# Patient Record
Sex: FEMALE | Race: WHITE | NOT HISPANIC OR LATINO
[De-identification: names, ages, dates, MRNs, and addresses within clinical notes are randomized per-mention and may not be internally consistent; named-entity substitution may affect disease eponyms.]

---

## 2017-02-28 ENCOUNTER — APPOINTMENT (OUTPATIENT)
Dept: HEMATOLOGY ONCOLOGY | Facility: CLINIC | Age: 57
End: 2017-02-28

## 2017-02-28 VITALS
BODY MASS INDEX: 47.62 KG/M2 | OXYGEN SATURATION: 98 % | HEART RATE: 98 BPM | RESPIRATION RATE: 18 BRPM | TEMPERATURE: 98.1 F | WEIGHT: 252 LBS | SYSTOLIC BLOOD PRESSURE: 150 MMHG | DIASTOLIC BLOOD PRESSURE: 90 MMHG

## 2017-04-03 ENCOUNTER — APPOINTMENT (OUTPATIENT)
Dept: FAMILY MEDICINE | Facility: CLINIC | Age: 57
End: 2017-04-03

## 2017-04-03 VITALS
TEMPERATURE: 99 F | HEART RATE: 78 BPM | SYSTOLIC BLOOD PRESSURE: 118 MMHG | DIASTOLIC BLOOD PRESSURE: 78 MMHG | RESPIRATION RATE: 20 BRPM

## 2017-04-11 ENCOUNTER — RX RENEWAL (OUTPATIENT)
Age: 57
End: 2017-04-11

## 2017-04-17 ENCOUNTER — RX RENEWAL (OUTPATIENT)
Age: 57
End: 2017-04-17

## 2017-04-20 ENCOUNTER — APPOINTMENT (OUTPATIENT)
Dept: FAMILY MEDICINE | Facility: CLINIC | Age: 57
End: 2017-04-20

## 2017-04-20 VITALS
SYSTOLIC BLOOD PRESSURE: 112 MMHG | RESPIRATION RATE: 20 BRPM | HEART RATE: 68 BPM | DIASTOLIC BLOOD PRESSURE: 70 MMHG | HEIGHT: 61 IN | WEIGHT: 253 LBS | BODY MASS INDEX: 47.77 KG/M2

## 2017-04-21 ENCOUNTER — RESULT REVIEW (OUTPATIENT)
Age: 57
End: 2017-04-21

## 2017-04-21 LAB
T3FREE SERPL-MCNC: 2.88 PG/ML
T4 FREE SERPL-MCNC: 1 NG/DL
TSH SERPL-ACNC: 4.08 UIU/ML

## 2017-06-01 LAB
ALBUMIN SERPL ELPH-MCNC: 4 G/DL
ALP BLD-CCNC: 71 U/L
ALT SERPL-CCNC: 29 U/L
ANION GAP SERPL CALC-SCNC: 17 MMOL/L
AST SERPL-CCNC: 23 U/L
BASOPHILS # BLD AUTO: 0.01 K/UL
BASOPHILS NFR BLD AUTO: 0.1 %
BILIRUB SERPL-MCNC: 0.4 MG/DL
BUN SERPL-MCNC: 13 MG/DL
CALCIUM SERPL-MCNC: 10.1 MG/DL
CHLORIDE SERPL-SCNC: 104 MMOL/L
CO2 SERPL-SCNC: 17 MMOL/L
CREAT SERPL-MCNC: 0.71 MG/DL
EOSINOPHIL # BLD AUTO: 0.09 K/UL
EOSINOPHIL NFR BLD AUTO: 1.1 %
GLUCOSE SERPL-MCNC: 136 MG/DL
HCT VFR BLD CALC: 39.7 %
HGB BLD-MCNC: 12.8 G/DL
IMM GRANULOCYTES NFR BLD AUTO: 0.1 %
LYMPHOCYTES # BLD AUTO: 1.68 K/UL
LYMPHOCYTES NFR BLD AUTO: 20.2 %
MAN DIFF?: NORMAL
MCHC RBC-ENTMCNC: 27.9 PG
MCHC RBC-ENTMCNC: 32.2 GM/DL
MCV RBC AUTO: 86.5 FL
MONOCYTES # BLD AUTO: 0.41 K/UL
MONOCYTES NFR BLD AUTO: 4.9 %
NEUTROPHILS # BLD AUTO: 6.1 K/UL
NEUTROPHILS NFR BLD AUTO: 73.6 %
PLATELET # BLD AUTO: 337 K/UL
POTASSIUM SERPL-SCNC: 4.2 MMOL/L
PROT SERPL-MCNC: 7.2 G/DL
RBC # BLD: 4.59 M/UL
RBC # FLD: 13.9 %
SODIUM SERPL-SCNC: 138 MMOL/L
WBC # FLD AUTO: 8.3 K/UL

## 2017-06-05 ENCOUNTER — APPOINTMENT (OUTPATIENT)
Dept: HEMATOLOGY ONCOLOGY | Facility: CLINIC | Age: 57
End: 2017-06-05

## 2017-06-05 ENCOUNTER — OUTPATIENT (OUTPATIENT)
Dept: OUTPATIENT SERVICES | Facility: HOSPITAL | Age: 57
LOS: 1 days | End: 2017-06-05
Payer: COMMERCIAL

## 2017-06-05 ENCOUNTER — APPOINTMENT (OUTPATIENT)
Dept: INFUSION THERAPY | Facility: HOSPITAL | Age: 57
End: 2017-06-05

## 2017-06-05 VITALS
TEMPERATURE: 98.5 F | HEART RATE: 76 BPM | SYSTOLIC BLOOD PRESSURE: 142 MMHG | DIASTOLIC BLOOD PRESSURE: 80 MMHG | WEIGHT: 252 LBS | BODY MASS INDEX: 47.62 KG/M2

## 2017-06-05 PROCEDURE — 96372 THER/PROPH/DIAG INJ SC/IM: CPT

## 2017-06-05 RX ORDER — METHYLPREDNISOLONE 4 MG/1
4 TABLET ORAL
Qty: 21 | Refills: 0 | Status: COMPLETED | COMMUNITY
Start: 2017-04-03 | End: 2017-06-05

## 2017-06-05 RX ORDER — DOXYCYCLINE 100 MG/1
100 CAPSULE ORAL TWICE DAILY
Qty: 14 | Refills: 0 | Status: COMPLETED | COMMUNITY
Start: 2017-04-03 | End: 2017-06-05

## 2017-06-05 RX ORDER — MULTIVIT-MIN/FOLIC/VIT K/LYCOP 400-300MCG
25 MCG TABLET ORAL
Refills: 0 | Status: ACTIVE | COMMUNITY

## 2017-06-06 ENCOUNTER — APPOINTMENT (OUTPATIENT)
Dept: RADIATION ONCOLOGY | Facility: CLINIC | Age: 57
End: 2017-06-06

## 2017-06-06 ENCOUNTER — APPOINTMENT (OUTPATIENT)
Dept: INFUSION THERAPY | Facility: HOSPITAL | Age: 57
End: 2017-06-06

## 2017-06-13 ENCOUNTER — APPOINTMENT (OUTPATIENT)
Dept: RADIATION ONCOLOGY | Facility: CLINIC | Age: 57
End: 2017-06-13

## 2017-06-13 DIAGNOSIS — C50.411 MALIGNANT NEOPLASM OF UPPER-OUTER QUADRANT OF RIGHT FEMALE BREAST: ICD-10-CM

## 2017-09-28 ENCOUNTER — RX RENEWAL (OUTPATIENT)
Age: 57
End: 2017-09-28

## 2017-10-02 ENCOUNTER — RX RENEWAL (OUTPATIENT)
Age: 57
End: 2017-10-02

## 2017-10-10 ENCOUNTER — APPOINTMENT (OUTPATIENT)
Dept: HEMATOLOGY ONCOLOGY | Facility: CLINIC | Age: 57
End: 2017-10-10
Payer: COMMERCIAL

## 2017-10-10 VITALS
SYSTOLIC BLOOD PRESSURE: 146 MMHG | WEIGHT: 252 LBS | HEART RATE: 74 BPM | DIASTOLIC BLOOD PRESSURE: 80 MMHG | TEMPERATURE: 98.3 F | BODY MASS INDEX: 47.62 KG/M2 | OXYGEN SATURATION: 98 % | RESPIRATION RATE: 18 BRPM

## 2017-10-10 DIAGNOSIS — D05.11 INTRADUCTAL CARCINOMA IN SITU OF RIGHT BREAST: ICD-10-CM

## 2017-10-10 DIAGNOSIS — M12.9 ARTHROPATHY, UNSPECIFIED: ICD-10-CM

## 2017-10-10 PROCEDURE — 99214 OFFICE O/P EST MOD 30 MIN: CPT

## 2017-10-17 ENCOUNTER — APPOINTMENT (OUTPATIENT)
Dept: FAMILY MEDICINE | Facility: CLINIC | Age: 57
End: 2017-10-17
Payer: COMMERCIAL

## 2017-10-17 PROCEDURE — G0008: CPT

## 2017-10-17 PROCEDURE — 90688 IIV4 VACCINE SPLT 0.5 ML IM: CPT

## 2017-10-30 ENCOUNTER — MEDICATION RENEWAL (OUTPATIENT)
Age: 57
End: 2017-10-30

## 2017-10-31 ENCOUNTER — APPOINTMENT (OUTPATIENT)
Dept: ORTHOPEDIC SURGERY | Facility: CLINIC | Age: 57
End: 2017-10-31
Payer: COMMERCIAL

## 2017-10-31 ENCOUNTER — APPOINTMENT (OUTPATIENT)
Dept: FAMILY MEDICINE | Facility: CLINIC | Age: 57
End: 2017-10-31
Payer: COMMERCIAL

## 2017-10-31 VITALS
HEART RATE: 78 BPM | TEMPERATURE: 98.6 F | RESPIRATION RATE: 20 BRPM | SYSTOLIC BLOOD PRESSURE: 126 MMHG | DIASTOLIC BLOOD PRESSURE: 80 MMHG

## 2017-10-31 VITALS
BODY MASS INDEX: 47.2 KG/M2 | WEIGHT: 250 LBS | HEIGHT: 61 IN | SYSTOLIC BLOOD PRESSURE: 143 MMHG | DIASTOLIC BLOOD PRESSURE: 71 MMHG | HEART RATE: 83 BPM

## 2017-10-31 DIAGNOSIS — Z87.09 PERSONAL HISTORY OF OTHER DISEASES OF THE RESPIRATORY SYSTEM: ICD-10-CM

## 2017-10-31 DIAGNOSIS — M65.312 TRIGGER THUMB, LEFT THUMB: ICD-10-CM

## 2017-10-31 PROCEDURE — 99213 OFFICE O/P EST LOW 20 MIN: CPT

## 2017-10-31 PROCEDURE — 20550 NJX 1 TENDON SHEATH/LIGAMENT: CPT | Mod: FA

## 2017-10-31 PROCEDURE — 99204 OFFICE O/P NEW MOD 45 MIN: CPT | Mod: 25

## 2017-10-31 PROCEDURE — 73130 X-RAY EXAM OF HAND: CPT | Mod: LT

## 2017-10-31 RX ORDER — VITAMIN E ACID SUCCINATE 268 MG
TABLET ORAL
Refills: 0 | Status: ACTIVE | COMMUNITY

## 2017-11-09 ENCOUNTER — RX RENEWAL (OUTPATIENT)
Age: 57
End: 2017-11-09

## 2017-11-29 ENCOUNTER — OTHER (OUTPATIENT)
Age: 57
End: 2017-11-29

## 2017-12-13 LAB
ALBUMIN SERPL ELPH-MCNC: 3.7 G/DL
ALP BLD-CCNC: 74 U/L
ALT SERPL-CCNC: 33 U/L
ANION GAP SERPL CALC-SCNC: 12 MMOL/L
AST SERPL-CCNC: 33 U/L
BASOPHILS # BLD AUTO: 0.02 K/UL
BASOPHILS NFR BLD AUTO: 0.2 %
BILIRUB SERPL-MCNC: 0.3 MG/DL
BUN SERPL-MCNC: 14 MG/DL
CALCIUM SERPL-MCNC: 10.1 MG/DL
CHLORIDE SERPL-SCNC: 105 MMOL/L
CO2 SERPL-SCNC: 22 MMOL/L
CREAT SERPL-MCNC: 0.71 MG/DL
EOSINOPHIL # BLD AUTO: 0.17 K/UL
EOSINOPHIL NFR BLD AUTO: 2.1 %
GLUCOSE SERPL-MCNC: 125 MG/DL
HCT VFR BLD CALC: 39 %
HGB BLD-MCNC: 12.5 G/DL
IMM GRANULOCYTES NFR BLD AUTO: 0.1 %
LYMPHOCYTES # BLD AUTO: 1.74 K/UL
LYMPHOCYTES NFR BLD AUTO: 21.1 %
MAN DIFF?: NORMAL
MCHC RBC-ENTMCNC: 28 PG
MCHC RBC-ENTMCNC: 32.1 GM/DL
MCV RBC AUTO: 87.2 FL
MONOCYTES # BLD AUTO: 0.5 K/UL
MONOCYTES NFR BLD AUTO: 6.1 %
NEUTROPHILS # BLD AUTO: 5.8 K/UL
NEUTROPHILS NFR BLD AUTO: 70.4 %
PLATELET # BLD AUTO: 336 K/UL
POTASSIUM SERPL-SCNC: 4.5 MMOL/L
PROT SERPL-MCNC: 7.3 G/DL
RBC # BLD: 4.47 M/UL
RBC # FLD: 13.8 %
SODIUM SERPL-SCNC: 139 MMOL/L
WBC # FLD AUTO: 8.24 K/UL

## 2017-12-15 ENCOUNTER — APPOINTMENT (OUTPATIENT)
Dept: INFUSION THERAPY | Facility: HOSPITAL | Age: 57
End: 2017-12-15

## 2017-12-15 ENCOUNTER — APPOINTMENT (OUTPATIENT)
Dept: HEMATOLOGY ONCOLOGY | Facility: CLINIC | Age: 57
End: 2017-12-15
Payer: COMMERCIAL

## 2017-12-15 ENCOUNTER — OUTPATIENT (OUTPATIENT)
Dept: OUTPATIENT SERVICES | Facility: HOSPITAL | Age: 57
LOS: 1 days | End: 2017-12-15
Payer: COMMERCIAL

## 2017-12-15 VITALS
BODY MASS INDEX: 48.18 KG/M2 | WEIGHT: 255 LBS | DIASTOLIC BLOOD PRESSURE: 80 MMHG | TEMPERATURE: 98.2 F | HEART RATE: 72 BPM | SYSTOLIC BLOOD PRESSURE: 158 MMHG

## 2017-12-15 DIAGNOSIS — Z87.09 PERSONAL HISTORY OF OTHER DISEASES OF THE RESPIRATORY SYSTEM: ICD-10-CM

## 2017-12-15 DIAGNOSIS — C50.919 MALIGNANT NEOPLASM OF UNSPECIFIED SITE OF UNSPECIFIED FEMALE BREAST: ICD-10-CM

## 2017-12-15 PROCEDURE — 99214 OFFICE O/P EST MOD 30 MIN: CPT

## 2017-12-15 PROCEDURE — 96372 THER/PROPH/DIAG INJ SC/IM: CPT

## 2017-12-18 DIAGNOSIS — M85.80 OTHER SPECIFIED DISORDERS OF BONE DENSITY AND STRUCTURE, UNSPECIFIED SITE: ICD-10-CM

## 2017-12-29 ENCOUNTER — RX RENEWAL (OUTPATIENT)
Age: 57
End: 2017-12-29

## 2018-01-05 ENCOUNTER — RX RENEWAL (OUTPATIENT)
Age: 58
End: 2018-01-05

## 2018-01-28 ENCOUNTER — RX RENEWAL (OUTPATIENT)
Age: 58
End: 2018-01-28

## 2018-02-26 ENCOUNTER — OTHER (OUTPATIENT)
Age: 58
End: 2018-02-26

## 2018-02-27 ENCOUNTER — MESSAGE (OUTPATIENT)
Age: 58
End: 2018-02-27

## 2018-03-06 ENCOUNTER — APPOINTMENT (OUTPATIENT)
Dept: FAMILY MEDICINE | Facility: CLINIC | Age: 58
End: 2018-03-06
Payer: COMMERCIAL

## 2018-03-06 VITALS
SYSTOLIC BLOOD PRESSURE: 122 MMHG | DIASTOLIC BLOOD PRESSURE: 70 MMHG | TEMPERATURE: 98.1 F | HEART RATE: 78 BPM | RESPIRATION RATE: 20 BRPM

## 2018-03-06 PROCEDURE — 99213 OFFICE O/P EST LOW 20 MIN: CPT

## 2018-03-10 ENCOUNTER — RX RENEWAL (OUTPATIENT)
Age: 58
End: 2018-03-10

## 2018-04-21 ENCOUNTER — RX RENEWAL (OUTPATIENT)
Age: 58
End: 2018-04-21

## 2018-05-30 ENCOUNTER — RECORD ABSTRACTING (OUTPATIENT)
Age: 58
End: 2018-05-30

## 2018-05-30 LAB
ALBUMIN SERPL ELPH-MCNC: 3.9 G/DL
ALP BLD-CCNC: 64 U/L
ALT SERPL-CCNC: 33 U/L
ANION GAP SERPL CALC-SCNC: 14 MMOL/L
AST SERPL-CCNC: 29 U/L
BASOPHILS # BLD AUTO: 0.01 K/UL
BASOPHILS NFR BLD AUTO: 0.1 %
BILIRUB SERPL-MCNC: 0.5 MG/DL
BUN SERPL-MCNC: 12 MG/DL
CALCIUM SERPL-MCNC: 10 MG/DL
CHLORIDE SERPL-SCNC: 101 MMOL/L
CO2 SERPL-SCNC: 23 MMOL/L
CREAT SERPL-MCNC: 0.63 MG/DL
EOSINOPHIL # BLD AUTO: 0.11 K/UL
EOSINOPHIL NFR BLD AUTO: 1.5 %
GLUCOSE SERPL-MCNC: 105 MG/DL
HCT VFR BLD CALC: 40.4 %
HGB BLD-MCNC: 13.2 G/DL
IMM GRANULOCYTES NFR BLD AUTO: 0.1 %
LYMPHOCYTES # BLD AUTO: 1.74 K/UL
LYMPHOCYTES NFR BLD AUTO: 23.2 %
MAN DIFF?: NORMAL
MCHC RBC-ENTMCNC: 28.1 PG
MCHC RBC-ENTMCNC: 32.7 GM/DL
MCV RBC AUTO: 86 FL
MONOCYTES # BLD AUTO: 0.39 K/UL
MONOCYTES NFR BLD AUTO: 5.2 %
NEUTROPHILS # BLD AUTO: 5.25 K/UL
NEUTROPHILS NFR BLD AUTO: 69.9 %
PLATELET # BLD AUTO: 328 K/UL
POTASSIUM SERPL-SCNC: 4.6 MMOL/L
PROT SERPL-MCNC: 7.2 G/DL
RBC # BLD: 4.7 M/UL
RBC # FLD: 13.7 %
SODIUM SERPL-SCNC: 138 MMOL/L
WBC # FLD AUTO: 7.51 K/UL

## 2018-06-01 ENCOUNTER — APPOINTMENT (OUTPATIENT)
Dept: INFUSION THERAPY | Facility: HOSPITAL | Age: 58
End: 2018-06-01

## 2018-06-01 ENCOUNTER — APPOINTMENT (OUTPATIENT)
Dept: HEMATOLOGY ONCOLOGY | Facility: CLINIC | Age: 58
End: 2018-06-01
Payer: COMMERCIAL

## 2018-06-01 ENCOUNTER — OUTPATIENT (OUTPATIENT)
Dept: OUTPATIENT SERVICES | Facility: HOSPITAL | Age: 58
LOS: 1 days | End: 2018-06-01
Payer: COMMERCIAL

## 2018-06-01 VITALS
OXYGEN SATURATION: 98 % | RESPIRATION RATE: 18 BRPM | TEMPERATURE: 97.6 F | SYSTOLIC BLOOD PRESSURE: 136 MMHG | DIASTOLIC BLOOD PRESSURE: 70 MMHG | HEART RATE: 91 BPM | BODY MASS INDEX: 47.8 KG/M2 | WEIGHT: 253 LBS

## 2018-06-01 DIAGNOSIS — M85.80 OTHER SPECIFIED DISORDERS OF BONE DENSITY AND STRUCTURE, UNSPECIFIED SITE: ICD-10-CM

## 2018-06-01 DIAGNOSIS — C50.919 MALIGNANT NEOPLASM OF UNSPECIFIED SITE OF UNSPECIFIED FEMALE BREAST: ICD-10-CM

## 2018-06-01 PROCEDURE — 96372 THER/PROPH/DIAG INJ SC/IM: CPT

## 2018-06-01 PROCEDURE — 99214 OFFICE O/P EST MOD 30 MIN: CPT

## 2018-06-06 ENCOUNTER — FORM ENCOUNTER (OUTPATIENT)
Age: 58
End: 2018-06-06

## 2018-06-07 ENCOUNTER — OUTPATIENT (OUTPATIENT)
Dept: OUTPATIENT SERVICES | Facility: HOSPITAL | Age: 58
LOS: 1 days | End: 2018-06-07
Payer: COMMERCIAL

## 2018-06-07 ENCOUNTER — APPOINTMENT (OUTPATIENT)
Dept: RADIOLOGY | Facility: HOSPITAL | Age: 58
End: 2018-06-07
Payer: COMMERCIAL

## 2018-06-07 DIAGNOSIS — C50.911 MALIGNANT NEOPLASM OF UNSPECIFIED SITE OF RIGHT FEMALE BREAST: ICD-10-CM

## 2018-06-07 PROCEDURE — 77080 DXA BONE DENSITY AXIAL: CPT

## 2018-06-07 PROCEDURE — 77080 DXA BONE DENSITY AXIAL: CPT | Mod: 26

## 2018-06-19 ENCOUNTER — APPOINTMENT (OUTPATIENT)
Dept: HEMATOLOGY ONCOLOGY | Facility: CLINIC | Age: 58
End: 2018-06-19

## 2018-07-06 ENCOUNTER — RX RENEWAL (OUTPATIENT)
Age: 58
End: 2018-07-06

## 2018-08-03 ENCOUNTER — RX RENEWAL (OUTPATIENT)
Age: 58
End: 2018-08-03

## 2018-08-25 ENCOUNTER — RX RENEWAL (OUTPATIENT)
Age: 58
End: 2018-08-25

## 2018-09-06 ENCOUNTER — APPOINTMENT (OUTPATIENT)
Dept: HEMATOLOGY ONCOLOGY | Facility: CLINIC | Age: 58
End: 2018-09-06

## 2018-09-24 ENCOUNTER — APPOINTMENT (OUTPATIENT)
Dept: HEMATOLOGY ONCOLOGY | Facility: CLINIC | Age: 58
End: 2018-09-24

## 2018-09-26 ENCOUNTER — APPOINTMENT (OUTPATIENT)
Dept: HEMATOLOGY ONCOLOGY | Facility: CLINIC | Age: 58
End: 2018-09-26
Payer: COMMERCIAL

## 2018-09-26 VITALS
WEIGHT: 255 LBS | SYSTOLIC BLOOD PRESSURE: 126 MMHG | TEMPERATURE: 98.3 F | DIASTOLIC BLOOD PRESSURE: 72 MMHG | BODY MASS INDEX: 48.18 KG/M2 | HEART RATE: 64 BPM

## 2018-09-26 PROCEDURE — 99214 OFFICE O/P EST MOD 30 MIN: CPT

## 2018-09-26 RX ORDER — PROMETHAZINE HYDROCHLORIDE AND DEXTROMETHORPHAN HYDROBROMIDE ORAL SOLUTION 15; 6.25 MG/5ML; MG/5ML
6.25-15 SOLUTION ORAL
Qty: 240 | Refills: 1 | Status: COMPLETED | COMMUNITY
Start: 2018-07-06 | End: 2018-09-26

## 2018-10-02 ENCOUNTER — RX CHANGE (OUTPATIENT)
Age: 58
End: 2018-10-02

## 2018-10-10 ENCOUNTER — APPOINTMENT (OUTPATIENT)
Dept: FAMILY MEDICINE | Facility: CLINIC | Age: 58
End: 2018-10-10
Payer: COMMERCIAL

## 2018-10-10 VITALS
HEART RATE: 68 BPM | BODY MASS INDEX: 48.15 KG/M2 | WEIGHT: 255 LBS | SYSTOLIC BLOOD PRESSURE: 124 MMHG | HEIGHT: 61 IN | RESPIRATION RATE: 20 BRPM | DIASTOLIC BLOOD PRESSURE: 70 MMHG

## 2018-10-10 PROCEDURE — 36415 COLL VENOUS BLD VENIPUNCTURE: CPT

## 2018-10-10 PROCEDURE — 90686 IIV4 VACC NO PRSV 0.5 ML IM: CPT

## 2018-10-10 PROCEDURE — 99214 OFFICE O/P EST MOD 30 MIN: CPT | Mod: 25

## 2018-10-10 PROCEDURE — G0008: CPT

## 2018-10-10 NOTE — ASSESSMENT
[FreeTextEntry1] : Hemodynamically stable; cardiac exam unremarkable; no clinical evidence of thyroid pathology\par Lab profiles sent\par Flu vaccine given L deltoid

## 2018-10-10 NOTE — HISTORY OF PRESENT ILLNESS
[de-identified] : Presents for BP check, labs, and general follow-up.  Reviewed immunizations and requests flu vaccine.  Note recent URI treated with "Z-Juan Jose" - now resolved.

## 2018-10-10 NOTE — PHYSICAL EXAM
[No Acute Distress] : no acute distress [Normal Outer Ear/Nose] : the outer ears and nose were normal in appearance [Normal Oropharynx] : the oropharynx was normal [Normal TMs] : both tympanic membranes were normal [Normal Nasal Mucosa] : the nasal mucosa was normal [Supple] : supple [Thyroid Normal, No Nodules] : the thyroid was normal and there were no nodules present [No Respiratory Distress] : no respiratory distress  [Clear to Auscultation] : lungs were clear to auscultation bilaterally [No Accessory Muscle Use] : no accessory muscle use [Normal Rate] : normal rate  [Regular Rhythm] : with a regular rhythm [Normal S1, S2] : normal S1 and S2 [No Murmur] : no murmur heard [No Edema] : there was no peripheral edema [Soft] : abdomen soft [Non Tender] : non-tender [Normal Posterior Cervical Nodes] : no posterior cervical lymphadenopathy [Normal Anterior Cervical Nodes] : no anterior cervical lymphadenopathy [No Focal Deficits] : no focal deficits [Alert and Oriented x3] : oriented to person, place, and time

## 2018-10-11 LAB
ALBUMIN SERPL ELPH-MCNC: 4.6 G/DL
ALP BLD-CCNC: 76 U/L
ALT SERPL-CCNC: 30 U/L
ANION GAP SERPL CALC-SCNC: 15 MMOL/L
AST SERPL-CCNC: 24 U/L
BASOPHILS # BLD AUTO: 0.02 K/UL
BASOPHILS NFR BLD AUTO: 0.2 %
BILIRUB SERPL-MCNC: 0.4 MG/DL
BUN SERPL-MCNC: 15 MG/DL
CALCIUM SERPL-MCNC: 10.1 MG/DL
CHLORIDE SERPL-SCNC: 108 MMOL/L
CHOLEST SERPL-MCNC: 187 MG/DL
CHOLEST/HDLC SERPL: 3.5 RATIO
CO2 SERPL-SCNC: 21 MMOL/L
CREAT SERPL-MCNC: 0.73 MG/DL
EOSINOPHIL # BLD AUTO: 0.11 K/UL
EOSINOPHIL NFR BLD AUTO: 1.2 %
GLUCOSE SERPL-MCNC: 90 MG/DL
HBA1C MFR BLD HPLC: 5.7 %
HCT VFR BLD CALC: 42.4 %
HCV AB SER QL: NONREACTIVE
HCV S/CO RATIO: 0.15 S/CO
HDLC SERPL-MCNC: 53 MG/DL
HGB BLD-MCNC: 13.4 G/DL
IMM GRANULOCYTES NFR BLD AUTO: 0.3 %
LDLC SERPL CALC-MCNC: 118 MG/DL
LYMPHOCYTES # BLD AUTO: 2.11 K/UL
LYMPHOCYTES NFR BLD AUTO: 22.4 %
MAN DIFF?: NORMAL
MCHC RBC-ENTMCNC: 27.9 PG
MCHC RBC-ENTMCNC: 31.6 GM/DL
MCV RBC AUTO: 88.1 FL
MONOCYTES # BLD AUTO: 0.45 K/UL
MONOCYTES NFR BLD AUTO: 4.8 %
NEUTROPHILS # BLD AUTO: 6.68 K/UL
NEUTROPHILS NFR BLD AUTO: 71.1 %
PLATELET # BLD AUTO: 379 K/UL
POTASSIUM SERPL-SCNC: 4.8 MMOL/L
PROT SERPL-MCNC: 7.3 G/DL
RBC # BLD: 4.81 M/UL
RBC # FLD: 14.4 %
SODIUM SERPL-SCNC: 144 MMOL/L
T4 FREE SERPL-MCNC: 1.3 NG/DL
TRIGL SERPL-MCNC: 80 MG/DL
TSH SERPL-ACNC: 3.16 UIU/ML
WBC # FLD AUTO: 9.4 K/UL

## 2018-10-16 RX ORDER — PROMETHAZINE HYDROCHLORIDE AND DEXTROMETHORPHAN HYDROBROMIDE ORAL SOLUTION 15; 6.25 MG/5ML; MG/5ML
6.25-15 SOLUTION ORAL EVERY 6 HOURS
Qty: 1 | Refills: 1 | Status: ACTIVE | COMMUNITY
Start: 2018-10-16 | End: 1900-01-01

## 2018-11-12 ENCOUNTER — OTHER (OUTPATIENT)
Age: 58
End: 2018-11-12

## 2018-11-13 ENCOUNTER — RX RENEWAL (OUTPATIENT)
Age: 58
End: 2018-11-13

## 2018-11-30 ENCOUNTER — OUTPATIENT (OUTPATIENT)
Dept: OUTPATIENT SERVICES | Facility: HOSPITAL | Age: 58
LOS: 1 days | Discharge: HOME | End: 2018-11-30

## 2018-11-30 DIAGNOSIS — Z98.890 OTHER SPECIFIED POSTPROCEDURAL STATES: Chronic | ICD-10-CM

## 2018-11-30 DIAGNOSIS — D64.9 ANEMIA, UNSPECIFIED: ICD-10-CM

## 2018-11-30 PROBLEM — D72.829 ELEVATED WHITE BLOOD CELL COUNT, UNSPECIFIED: Chronic | Status: ACTIVE | Noted: 2018-11-20

## 2018-11-30 PROBLEM — K21.9 GASTRO-ESOPHAGEAL REFLUX DISEASE WITHOUT ESOPHAGITIS: Chronic | Status: ACTIVE | Noted: 2018-11-20

## 2018-11-30 PROBLEM — M85.80 OTHER SPECIFIED DISORDERS OF BONE DENSITY AND STRUCTURE, UNSPECIFIED SITE: Chronic | Status: ACTIVE | Noted: 2018-11-20

## 2018-11-30 PROBLEM — E03.9 HYPOTHYROIDISM, UNSPECIFIED: Chronic | Status: ACTIVE | Noted: 2018-11-20

## 2018-11-30 PROBLEM — F32.9 MAJOR DEPRESSIVE DISORDER, SINGLE EPISODE, UNSPECIFIED: Chronic | Status: ACTIVE | Noted: 2018-11-20

## 2018-12-03 ENCOUNTER — APPOINTMENT (OUTPATIENT)
Dept: INFUSION THERAPY | Facility: HOSPITAL | Age: 58
End: 2018-12-03

## 2018-12-12 LAB
ALBUMIN SERPL ELPH-MCNC: 4.1 G/DL
ALP BLD-CCNC: 96 U/L
ALT SERPL-CCNC: 33 U/L
ANION GAP SERPL CALC-SCNC: 13 MMOL/L
AST SERPL-CCNC: 31 U/L
BASOPHILS # BLD AUTO: 0.02 K/UL
BASOPHILS NFR BLD AUTO: 0.2 %
BILIRUB SERPL-MCNC: 0.4 MG/DL
BUN SERPL-MCNC: 13 MG/DL
CALCIUM SERPL-MCNC: 10.5 MG/DL
CHLORIDE SERPL-SCNC: 104 MMOL/L
CO2 SERPL-SCNC: 22 MMOL/L
CREAT SERPL-MCNC: 0.74 MG/DL
EOSINOPHIL # BLD AUTO: 0.16 K/UL
EOSINOPHIL NFR BLD AUTO: 1.8 %
GLUCOSE SERPL-MCNC: 93 MG/DL
HCT VFR BLD CALC: 41.6 %
HGB BLD-MCNC: 13.6 G/DL
IMM GRANULOCYTES NFR BLD AUTO: 0.3 %
LYMPHOCYTES # BLD AUTO: 1.86 K/UL
LYMPHOCYTES NFR BLD AUTO: 20.4 %
MAN DIFF?: NORMAL
MCHC RBC-ENTMCNC: 28.4 PG
MCHC RBC-ENTMCNC: 32.7 GM/DL
MCV RBC AUTO: 86.8 FL
MONOCYTES # BLD AUTO: 0.51 K/UL
MONOCYTES NFR BLD AUTO: 5.6 %
NEUTROPHILS # BLD AUTO: 6.53 K/UL
NEUTROPHILS NFR BLD AUTO: 71.7 %
PLATELET # BLD AUTO: 372 K/UL
POTASSIUM SERPL-SCNC: 4.6 MMOL/L
PROT SERPL-MCNC: 7.4 G/DL
RBC # BLD: 4.79 M/UL
RBC # FLD: 13.7 %
SODIUM SERPL-SCNC: 139 MMOL/L
WBC # FLD AUTO: 9.11 K/UL

## 2018-12-18 ENCOUNTER — APPOINTMENT (OUTPATIENT)
Dept: HEMATOLOGY ONCOLOGY | Facility: CLINIC | Age: 58
End: 2018-12-18
Payer: COMMERCIAL

## 2018-12-18 ENCOUNTER — APPOINTMENT (OUTPATIENT)
Dept: INFUSION THERAPY | Facility: HOSPITAL | Age: 58
End: 2018-12-18

## 2018-12-18 VITALS
SYSTOLIC BLOOD PRESSURE: 149 MMHG | WEIGHT: 261.23 LBS | DIASTOLIC BLOOD PRESSURE: 81 MMHG | BODY MASS INDEX: 49.36 KG/M2 | OXYGEN SATURATION: 100 % | TEMPERATURE: 97.5 F | HEART RATE: 74 BPM | RESPIRATION RATE: 20 BRPM

## 2018-12-18 DIAGNOSIS — Z87.09 PERSONAL HISTORY OF OTHER DISEASES OF THE RESPIRATORY SYSTEM: ICD-10-CM

## 2018-12-18 DIAGNOSIS — Z12.4 ENCOUNTER FOR SCREENING FOR MALIGNANT NEOPLASM OF CERVIX: ICD-10-CM

## 2018-12-18 PROCEDURE — 99214 OFFICE O/P EST MOD 30 MIN: CPT

## 2018-12-18 NOTE — OB HISTORY
[Menarche Age: ____] : age at menarche was [unfilled] [Post-Menopause, No Sxs] : post-menopausal, currently without symptoms [Menopause Age: ____] : age at menopause was [unfilled] [___] : Total Pregnancies: [unfilled]

## 2018-12-18 NOTE — PHYSICAL EXAM
[Normal] : affect appropriate [de-identified] : Non-toxic appearing. [de-identified] : Large, pendulous breasts. No new dominant mass, nipple retraction, or discharge.

## 2018-12-18 NOTE — HISTORY OF PRESENT ILLNESS
[Disease: _____________________] : Disease: [unfilled] [T: ___] : T[unfilled] [N: ___] : N[unfilled] [M: ___] : M[unfilled] [AJCC Stage: ____] : AJCC Stage: [unfilled] [de-identified] : 5/2016-Right breast cancer -Stage IA ([id number]), ER positive/[place] positive/HER-2/keke negative. Status post right breast conservation surgery--> RT-->Arimidex begun 10/2016, and discontinued 5/2018 secondary to arthralgias. Began Aromasin 6/2018.\par Osteopenia-receiving biannual Prolia injections on aromatase inhibitor therapy.  [de-identified] : Invasive ductal carcinoma [de-identified] : Oncotype recurrence score of 12, placing patient in low-risk category.\par  [de-identified] : S/P recent PAP smear. No new breast pain or lumps. Still with some arthralgias, though  "tolerable."\par No fevers. No chest pain, shortness of breath, change in bowel or bladder habits.  No c/o bleeding. No dental/gingival pain.\par

## 2018-12-18 NOTE — CONSULT LETTER
[Dear  ___] : Dear  [unfilled], [Courtesy Letter:] : I had the pleasure of seeing your patient, [unfilled], in my office today. [Please see my note below.] : Please see my note below. [Consult Closing:] : Thank you very much for allowing me to participate in the care of this patient.  If you have any questions, please do not hesitate to contact me. [Sincerely,] : Sincerely, [DrDeena  ___] : Dr. LOPEZ [FreeTextEntry3] : Loida Johnson M.D.

## 2018-12-19 ENCOUNTER — RX RENEWAL (OUTPATIENT)
Age: 58
End: 2018-12-19

## 2018-12-19 DIAGNOSIS — M85.80 OTHER SPECIFIED DISORDERS OF BONE DENSITY AND STRUCTURE, UNSPECIFIED SITE: ICD-10-CM

## 2018-12-19 DIAGNOSIS — C50.911 MALIGNANT NEOPLASM OF UNSPECIFIED SITE OF RIGHT FEMALE BREAST: ICD-10-CM

## 2019-04-09 ENCOUNTER — FORM ENCOUNTER (OUTPATIENT)
Age: 59
End: 2019-04-09

## 2019-04-10 ENCOUNTER — APPOINTMENT (OUTPATIENT)
Dept: FAMILY MEDICINE | Facility: CLINIC | Age: 59
End: 2019-04-10
Payer: COMMERCIAL

## 2019-04-10 ENCOUNTER — OUTPATIENT (OUTPATIENT)
Dept: OUTPATIENT SERVICES | Facility: HOSPITAL | Age: 59
LOS: 1 days | End: 2019-04-10
Payer: COMMERCIAL

## 2019-04-10 ENCOUNTER — APPOINTMENT (OUTPATIENT)
Dept: ULTRASOUND IMAGING | Facility: HOSPITAL | Age: 59
End: 2019-04-10
Payer: COMMERCIAL

## 2019-04-10 VITALS — DIASTOLIC BLOOD PRESSURE: 78 MMHG | HEART RATE: 76 BPM | SYSTOLIC BLOOD PRESSURE: 130 MMHG | RESPIRATION RATE: 20 BRPM

## 2019-04-10 DIAGNOSIS — R22.31 LOCALIZED SWELLING, MASS AND LUMP, RIGHT UPPER LIMB: ICD-10-CM

## 2019-04-10 DIAGNOSIS — Z98.890 OTHER SPECIFIED POSTPROCEDURAL STATES: Chronic | ICD-10-CM

## 2019-04-10 PROCEDURE — 99213 OFFICE O/P EST LOW 20 MIN: CPT

## 2019-04-10 PROCEDURE — 76882 US LMTD JT/FCL EVL NVASC XTR: CPT | Mod: 26,RT

## 2019-04-10 PROCEDURE — 76882 US LMTD JT/FCL EVL NVASC XTR: CPT

## 2019-04-10 NOTE — PHYSICAL EXAM
[No Acute Distress] : no acute distress [Supple] : supple [No Respiratory Distress] : no respiratory distress  [Clear to Auscultation] : lungs were clear to auscultation bilaterally [No Accessory Muscle Use] : no accessory muscle use [Normal Percussion] : the chest was normal to percussion [Normal Rate] : normal rate  [Regular Rhythm] : with a regular rhythm [Normal S1, S2] : normal S1 and S2 [No Murmur] : no murmur heard [Soft] : abdomen soft [Non Tender] : non-tender [Normal Posterior Cervical Nodes] : no posterior cervical lymphadenopathy [Normal Anterior Cervical Nodes] : no anterior cervical lymphadenopathy [No Joint Swelling] : no joint swelling [Grossly Normal Strength/Tone] : grossly normal strength/tone [No Rash] : no rash [No Skin Lesions] : no skin lesions [No Focal Deficits] : no focal deficits [Alert and Oriented x3] : oriented to person, place, and time [de-identified] : anxious, but in no distress [de-identified] : prominence over R triceps with no actual discrete mass

## 2019-04-10 NOTE — ASSESSMENT
[FreeTextEntry1] : "Prominence" R upper arm - with known H/O malignancy feel prudent to obtain US RUE

## 2019-04-10 NOTE — HISTORY OF PRESENT ILLNESS
[FreeTextEntry8] : Presents on acute basis - states feels "lump" R upper arm - very concerned.  Does states she is a "righty" and "uses R arm for everything."  Denies trauma, pain.

## 2019-04-11 ENCOUNTER — RESULT REVIEW (OUTPATIENT)
Age: 59
End: 2019-04-11

## 2019-05-13 ENCOUNTER — MEDICATION RENEWAL (OUTPATIENT)
Age: 59
End: 2019-05-13

## 2019-05-25 ENCOUNTER — APPOINTMENT (OUTPATIENT)
Dept: FAMILY MEDICINE | Facility: CLINIC | Age: 59
End: 2019-05-25
Payer: COMMERCIAL

## 2019-05-25 VITALS
SYSTOLIC BLOOD PRESSURE: 125 MMHG | HEART RATE: 76 BPM | RESPIRATION RATE: 20 BRPM | DIASTOLIC BLOOD PRESSURE: 70 MMHG | TEMPERATURE: 98.3 F

## 2019-05-25 DIAGNOSIS — Z87.09 PERSONAL HISTORY OF OTHER DISEASES OF THE RESPIRATORY SYSTEM: ICD-10-CM

## 2019-05-25 PROCEDURE — 99213 OFFICE O/P EST LOW 20 MIN: CPT

## 2019-05-25 NOTE — PHYSICAL EXAM
[No Acute Distress] : no acute distress [Normal Nasal Mucosa] : the nasal mucosa was normal [Normal Outer Ear/Nose] : the outer ears and nose were normal in appearance [Supple] : supple [Clear to Auscultation] : lungs were clear to auscultation bilaterally [No Respiratory Distress] : no respiratory distress  [No Accessory Muscle Use] : no accessory muscle use [Normal Rate] : normal rate  [Normal S1, S2] : normal S1 and S2 [Regular Rhythm] : with a regular rhythm [Soft] : abdomen soft [No Murmur] : no murmur heard [Cervical Lymph Nodes Enlarged Anterior Left] : enenlarged node(s) on the left [Non Tender] : non-tender [No Focal Deficits] : no focal deficits [Alert and Oriented x3] : oriented to person, place, and time [de-identified] : TMs and pharynx congested; pharynx markedly injected

## 2019-05-25 NOTE — ASSESSMENT
[FreeTextEntry1] : Pharyngitis - will order Augmentin 875mg twice daily for 7 days; encourage fludis.

## 2019-05-25 NOTE — HISTORY OF PRESENT ILLNESS
[FreeTextEntry8] : Presents on acute basis - has had several days of increasing sore throat, congestion.

## 2019-06-07 ENCOUNTER — APPOINTMENT (OUTPATIENT)
Dept: FAMILY MEDICINE | Facility: CLINIC | Age: 59
End: 2019-06-07

## 2019-06-12 ENCOUNTER — OUTPATIENT (OUTPATIENT)
Dept: OUTPATIENT SERVICES | Facility: HOSPITAL | Age: 59
LOS: 1 days | Discharge: HOME | End: 2019-06-12

## 2019-06-12 DIAGNOSIS — D64.9 ANEMIA, UNSPECIFIED: ICD-10-CM

## 2019-06-12 DIAGNOSIS — Z98.890 OTHER SPECIFIED POSTPROCEDURAL STATES: Chronic | ICD-10-CM

## 2019-06-25 ENCOUNTER — APPOINTMENT (OUTPATIENT)
Dept: ORTHOPEDIC SURGERY | Facility: CLINIC | Age: 59
End: 2019-06-25
Payer: COMMERCIAL

## 2019-06-25 DIAGNOSIS — S63.502A UNSPECIFIED SPRAIN OF LEFT WRIST, INITIAL ENCOUNTER: ICD-10-CM

## 2019-06-25 PROCEDURE — 73110 X-RAY EXAM OF WRIST: CPT | Mod: LT

## 2019-06-25 PROCEDURE — 99212 OFFICE O/P EST SF 10 MIN: CPT

## 2019-06-25 PROCEDURE — 99202 OFFICE O/P NEW SF 15 MIN: CPT

## 2019-06-25 NOTE — HISTORY OF PRESENT ILLNESS
[de-identified] : The patient is a 59 year old female who presents for evaluation of pain involving the left wrist that developed x 1 week ago. The patient states that her symptoms were of sudden onset which occurred as she was getting out of a bathtub and felt immediate pain in the wrist. She states that she used the wrist to lift herself up. She did not hear a pop.  The symptoms are localized over the radial styloid. The patient states the symptoms are alleviated somewhat by rest. She has also been taking 800 mg Ibuprofen. It is worse late in the date. She has had no treatment to date. \par \par Of note, the patient has a history of breast cancer with conservation surgery. She does admit to joint pain due to the medication she is taking for treatment maintenance.

## 2019-06-25 NOTE — END OF VISIT
[FreeTextEntry3] : I, Barrington Lassiter MD, ordering physician, have read and attest that all the information, medical decision making and discharge instructions within are true and accurate.

## 2019-06-25 NOTE — DISCUSSION/SUMMARY
[de-identified] : The underlying pathophysiology was reviewed with the patient. Treatment options were discussed including; observation, NSAIDS, analgesics, bracing, injection, and surgical intervention. \par \par Right wrist de Quervain's tenosynovitis. \par \par Patient was given the option of a cortisone injection today, but she did not consent. She may return in the future for one if her symptoms persist or worsen. For the interim I advised the following treatment course:\par \par Patient was advised to utilize a carpal tunnel wrist brace for strenuous activity. \par NSAIDs as tolerated. \par Topical analgesics as needed. \par Follow up as needed.

## 2019-06-25 NOTE — ADDENDUM
[FreeTextEntry1] : I, Shannan Smith wrote this note acting as a scribe for Dr. Barrington Lassiter on Jun 25, 2019.

## 2019-06-25 NOTE — PHYSICAL EXAM
[de-identified] : Patient is WDWN, alert, and in no acute distress. Breathing is unlabored. She is grossly oriented to person, place, and time. \par \par Left Wrist: There is radial styloid tenderness and swelling. There are no deformities. The ROM is full in all planes with no crepitus. There is pain with radial deviation of the wrist. There is no joint instability on provocative testing. Finkelstein's test is positive. Sensation is normal.  [de-identified] : AP, lateral and oblique views of the left wrist were obtained today and revealed no abnormalities. There is evidence of an osseous bone chip near to the lunate which appears to be due to a previous injury.

## 2019-07-22 ENCOUNTER — OUTPATIENT (OUTPATIENT)
Dept: OUTPATIENT SERVICES | Facility: HOSPITAL | Age: 59
LOS: 1 days | Discharge: HOME | End: 2019-07-22

## 2019-07-22 DIAGNOSIS — Z98.890 OTHER SPECIFIED POSTPROCEDURAL STATES: Chronic | ICD-10-CM

## 2019-07-22 DIAGNOSIS — D64.9 ANEMIA, UNSPECIFIED: ICD-10-CM

## 2019-07-23 ENCOUNTER — RECORD ABSTRACTING (OUTPATIENT)
Age: 59
End: 2019-07-23

## 2019-07-26 ENCOUNTER — LABORATORY RESULT (OUTPATIENT)
Age: 59
End: 2019-07-26

## 2019-07-31 ENCOUNTER — APPOINTMENT (OUTPATIENT)
Dept: INFUSION THERAPY | Facility: HOSPITAL | Age: 59
End: 2019-07-31

## 2019-07-31 ENCOUNTER — APPOINTMENT (OUTPATIENT)
Dept: HEMATOLOGY ONCOLOGY | Facility: CLINIC | Age: 59
End: 2019-07-31
Payer: COMMERCIAL

## 2019-07-31 VITALS
DIASTOLIC BLOOD PRESSURE: 80 MMHG | WEIGHT: 265.43 LBS | RESPIRATION RATE: 17 BRPM | HEART RATE: 86 BPM | BODY MASS INDEX: 50.15 KG/M2 | OXYGEN SATURATION: 99 % | SYSTOLIC BLOOD PRESSURE: 147 MMHG | TEMPERATURE: 98.4 F

## 2019-07-31 PROCEDURE — 99214 OFFICE O/P EST MOD 30 MIN: CPT

## 2019-07-31 RX ORDER — DOXYCYCLINE 100 MG/1
100 TABLET, FILM COATED ORAL TWICE DAILY
Qty: 14 | Refills: 0 | Status: DISCONTINUED | COMMUNITY
Start: 2018-10-16 | End: 2019-07-31

## 2019-07-31 NOTE — CONSULT LETTER
[Courtesy Letter:] : I had the pleasure of seeing your patient, [unfilled], in my office today. [Dear  ___] : Dear  [unfilled], [Consult Closing:] : Thank you very much for allowing me to participate in the care of this patient.  If you have any questions, please do not hesitate to contact me. [Please see my note below.] : Please see my note below. [Sincerely,] : Sincerely, [DrDeena  ___] : Dr. LOPEZ [FreeTextEntry3] : Loida Johnson M.D.

## 2019-07-31 NOTE — PHYSICAL EXAM
[Normal] : affect appropriate [de-identified] : Large, pendulous breasts. No new dominant mass, nipple retraction, or discharge.

## 2019-07-31 NOTE — HISTORY OF PRESENT ILLNESS
[Disease: _____________________] : Disease: [unfilled] [N: ___] : N[unfilled] [T: ___] : T[unfilled] [M: ___] : M[unfilled] [AJCC Stage: ____] : AJCC Stage: [unfilled] [de-identified] : Invasive ductal carcinoma [de-identified] : 5/2016-Right breast cancer -Stage IA ([id number]), ER positive/[place] positive/HER-2/keke negative. Status post right breast conservation surgery--> RT-->Arimidex begun 10/2016, and discontinued 5/2018 secondary to arthralgias. Began Aromasin 6/2018.\par Osteopenia-receiving biannual Prolia injections on aromatase inhibitor therapy.  [de-identified] : Oncotype recurrence score of 12, placing patient in low-risk category.\par  [de-identified] : S/P antibiotic course for recent URI-now feeling well. 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 Peña in surgical f/u. No new breast pain or lumps. \par No fevers. No chest pain, shortness of breath, change in bowel or bladder habits. No abdominal pain. No c/o bleeding. No dental/gingival pain.\par

## 2019-07-31 NOTE — RESULTS/DATA
[FreeTextEntry1] : 4/2019-mammogram showed no significant findings. Breast ultrasound showed no suspicious abnormalities. 3 cm seroma in the right breast underlying a scar.

## 2019-07-31 NOTE — ASSESSMENT
[FreeTextEntry1] : #1) breast cancer-clinically stable. Patient is agreeable to continue Aromasin.\par #2) osteopenia-clinically stable for Prolia injection today, to which patient consents.\par #3) slightly elevated SGPT-? secondary to recent antibiotic-short interval repeat LFTs ordered. If LFT(s) remain elevated--> to consider hepatic imaging.\par Patient's/'s questions have been answered to their apparent satisfaction at this time.

## 2019-08-01 DIAGNOSIS — M85.80 OTHER SPECIFIED DISORDERS OF BONE DENSITY AND STRUCTURE, UNSPECIFIED SITE: ICD-10-CM

## 2019-08-01 DIAGNOSIS — D50.9 IRON DEFICIENCY ANEMIA, UNSPECIFIED: ICD-10-CM

## 2019-08-05 ENCOUNTER — APPOINTMENT (OUTPATIENT)
Dept: ORTHOPEDIC SURGERY | Facility: CLINIC | Age: 59
End: 2019-08-05
Payer: COMMERCIAL

## 2019-08-05 VITALS — HEIGHT: 61 IN | WEIGHT: 255 LBS | BODY MASS INDEX: 48.15 KG/M2

## 2019-08-05 DIAGNOSIS — Z85.9 PERSONAL HISTORY OF MALIGNANT NEOPLASM, UNSPECIFIED: ICD-10-CM

## 2019-08-05 PROCEDURE — 20610 DRAIN/INJ JOINT/BURSA W/O US: CPT | Mod: LT

## 2019-08-05 PROCEDURE — 73030 X-RAY EXAM OF SHOULDER: CPT | Mod: LT

## 2019-08-05 PROCEDURE — 99214 OFFICE O/P EST MOD 30 MIN: CPT | Mod: 25

## 2019-08-05 RX ADMIN — LIDOCAINE HYDROCHLORIDE 3 %: 10 INJECTION, SOLUTION INFILTRATION; PERINEURAL at 00:00

## 2019-08-05 RX ADMIN — METHYLPREDNISOLONE ACETATE 2 MG/ML: 40 INJECTION, SUSPENSION INTRALESIONAL; INTRAMUSCULAR; INTRASYNOVIAL; SOFT TISSUE at 00:00

## 2019-08-07 DIAGNOSIS — C50.911 MALIGNANT NEOPLASM OF UNSPECIFIED SITE OF RIGHT FEMALE BREAST: ICD-10-CM

## 2019-08-07 DIAGNOSIS — Z17.0 ESTROGEN RECEPTOR POSITIVE STATUS [ER+]: ICD-10-CM

## 2019-08-21 ENCOUNTER — APPOINTMENT (OUTPATIENT)
Dept: FAMILY MEDICINE | Facility: CLINIC | Age: 59
End: 2019-08-21
Payer: COMMERCIAL

## 2019-08-21 VITALS
SYSTOLIC BLOOD PRESSURE: 132 MMHG | HEART RATE: 78 BPM | DIASTOLIC BLOOD PRESSURE: 70 MMHG | BODY MASS INDEX: 49.28 KG/M2 | RESPIRATION RATE: 20 BRPM | HEIGHT: 61 IN | WEIGHT: 261 LBS

## 2019-08-21 DIAGNOSIS — S99.921A UNSPECIFIED INJURY OF RIGHT FOOT, INITIAL ENCOUNTER: ICD-10-CM

## 2019-08-21 PROCEDURE — 99213 OFFICE O/P EST LOW 20 MIN: CPT | Mod: 25

## 2019-08-21 PROCEDURE — 36415 COLL VENOUS BLD VENIPUNCTURE: CPT

## 2019-08-21 NOTE — HISTORY OF PRESENT ILLNESS
[de-identified] : Presents for BP check, labs, and general follow-up.  States trying to watch diet; has started walking and just bought a bicycle.

## 2019-08-21 NOTE — PHYSICAL EXAM
[No Acute Distress] : no acute distress [No Edema] : there was no peripheral edema [Normal] : normal rate, regular rhythm, normal S1 and S2 and no murmur heard [Soft] : abdomen soft [Non Tender] : non-tender [Coordination Grossly Intact] : coordination grossly intact [No Focal Deficits] : no focal deficits [Normal Gait] : normal gait [Alert and Oriented x3] : oriented to person, place, and time

## 2019-08-22 LAB
ALBUMIN SERPL ELPH-MCNC: 4.2 G/DL
ALP BLD-CCNC: 120 U/L
ALT SERPL-CCNC: 39 U/L
ANION GAP SERPL CALC-SCNC: 14 MMOL/L
AST SERPL-CCNC: 26 U/L
BASOPHILS # BLD AUTO: 0.04 K/UL
BASOPHILS NFR BLD AUTO: 0.4 %
BILIRUB SERPL-MCNC: 0.4 MG/DL
BUN SERPL-MCNC: 16 MG/DL
CALCIUM SERPL-MCNC: 10.3 MG/DL
CHLORIDE SERPL-SCNC: 104 MMOL/L
CHOLEST SERPL-MCNC: 184 MG/DL
CHOLEST/HDLC SERPL: 3.3 RATIO
CO2 SERPL-SCNC: 22 MMOL/L
CREAT SERPL-MCNC: 0.67 MG/DL
EOSINOPHIL # BLD AUTO: 0.16 K/UL
EOSINOPHIL NFR BLD AUTO: 1.6 %
ESTIMATED AVERAGE GLUCOSE: 114 MG/DL
FOLATE SERPL-MCNC: >20 NG/ML
GLUCOSE SERPL-MCNC: 90 MG/DL
HBA1C MFR BLD HPLC: 5.6 %
HCT VFR BLD CALC: 43.6 %
HDLC SERPL-MCNC: 56 MG/DL
HGB BLD-MCNC: 13.8 G/DL
IMM GRANULOCYTES NFR BLD AUTO: 0.4 %
LDLC SERPL CALC-MCNC: 113 MG/DL
LYMPHOCYTES # BLD AUTO: 2.1 K/UL
LYMPHOCYTES NFR BLD AUTO: 20.7 %
MAN DIFF?: NORMAL
MCHC RBC-ENTMCNC: 28.4 PG
MCHC RBC-ENTMCNC: 31.7 GM/DL
MCV RBC AUTO: 89.7 FL
MONOCYTES # BLD AUTO: 0.55 K/UL
MONOCYTES NFR BLD AUTO: 5.4 %
NEUTROPHILS # BLD AUTO: 7.24 K/UL
NEUTROPHILS NFR BLD AUTO: 71.5 %
PLATELET # BLD AUTO: 337 K/UL
POTASSIUM SERPL-SCNC: 4.7 MMOL/L
PROT SERPL-MCNC: 7 G/DL
RBC # BLD: 4.86 M/UL
RBC # FLD: 13.7 %
SODIUM SERPL-SCNC: 140 MMOL/L
T4 FREE SERPL-MCNC: 1.2 NG/DL
TRIGL SERPL-MCNC: 76 MG/DL
TSH SERPL-ACNC: 4.21 UIU/ML
VIT B12 SERPL-MCNC: 711 PG/ML
WBC # FLD AUTO: 10.13 K/UL

## 2019-08-22 RX ORDER — METHYLPRED ACET/NACL,ISO-OS/PF 40 MG/ML
40 VIAL (ML) INJECTION
Qty: 1 | Refills: 0 | Status: COMPLETED | OUTPATIENT
Start: 2019-08-05

## 2019-08-22 RX ORDER — LIDOCAINE HYDROCHLORIDE 10 MG/ML
1 INJECTION, SOLUTION INFILTRATION; PERINEURAL
Refills: 0 | Status: COMPLETED | OUTPATIENT
Start: 2019-08-05

## 2019-08-28 ENCOUNTER — APPOINTMENT (OUTPATIENT)
Dept: FAMILY MEDICINE | Facility: CLINIC | Age: 59
End: 2019-08-28
Payer: COMMERCIAL

## 2019-08-28 DIAGNOSIS — Z00.00 ENCOUNTER FOR GENERAL ADULT MEDICAL EXAMINATION W/OUT ABNORMAL FINDINGS: ICD-10-CM

## 2019-08-28 PROCEDURE — 86580 TB INTRADERMAL TEST: CPT

## 2019-08-28 PROCEDURE — 90471 IMMUNIZATION ADMIN: CPT

## 2019-08-28 PROCEDURE — 90715 TDAP VACCINE 7 YRS/> IM: CPT

## 2019-09-25 ENCOUNTER — CHART COPY (OUTPATIENT)
Age: 59
End: 2019-09-25

## 2019-10-07 ENCOUNTER — TRANSCRIPTION ENCOUNTER (OUTPATIENT)
Age: 59
End: 2019-10-07

## 2019-10-17 ENCOUNTER — APPOINTMENT (OUTPATIENT)
Dept: FAMILY MEDICINE | Facility: CLINIC | Age: 59
End: 2019-10-17
Payer: COMMERCIAL

## 2019-10-17 PROCEDURE — 90686 IIV4 VACC NO PRSV 0.5 ML IM: CPT

## 2019-10-17 PROCEDURE — G0008: CPT

## 2019-10-22 ENCOUNTER — APPOINTMENT (OUTPATIENT)
Dept: MRI IMAGING | Facility: HOSPITAL | Age: 59
End: 2019-10-22

## 2019-10-25 ENCOUNTER — OUTPATIENT (OUTPATIENT)
Dept: OUTPATIENT SERVICES | Facility: HOSPITAL | Age: 59
LOS: 1 days | End: 2019-10-25
Payer: COMMERCIAL

## 2019-10-25 ENCOUNTER — APPOINTMENT (OUTPATIENT)
Dept: MRI IMAGING | Facility: HOSPITAL | Age: 59
End: 2019-10-25
Payer: COMMERCIAL

## 2019-10-25 DIAGNOSIS — M65.4 RADIAL STYLOID TENOSYNOVITIS [DE QUERVAIN]: ICD-10-CM

## 2019-10-25 DIAGNOSIS — M65.312 TRIGGER THUMB, LEFT THUMB: ICD-10-CM

## 2019-10-25 DIAGNOSIS — Z98.890 OTHER SPECIFIED POSTPROCEDURAL STATES: Chronic | ICD-10-CM

## 2019-10-25 PROCEDURE — 73221 MRI JOINT UPR EXTREM W/O DYE: CPT

## 2019-10-25 PROCEDURE — 73221 MRI JOINT UPR EXTREM W/O DYE: CPT | Mod: 26,LT

## 2019-11-05 ENCOUNTER — APPOINTMENT (OUTPATIENT)
Dept: ORTHOPEDIC SURGERY | Facility: CLINIC | Age: 59
End: 2019-11-05

## 2019-12-07 ENCOUNTER — APPOINTMENT (OUTPATIENT)
Dept: ORTHOPEDIC SURGERY | Facility: CLINIC | Age: 59
End: 2019-12-07
Payer: COMMERCIAL

## 2019-12-07 VITALS — BODY MASS INDEX: 49.28 KG/M2 | WEIGHT: 261 LBS | HEIGHT: 61 IN

## 2019-12-07 DIAGNOSIS — M65.4 RADIAL STYLOID TENOSYNOVITIS [DE QUERVAIN]: ICD-10-CM

## 2019-12-07 DIAGNOSIS — M75.32 CALCIFIC TENDINITIS OF LEFT SHOULDER: ICD-10-CM

## 2019-12-07 PROCEDURE — 99213 OFFICE O/P EST LOW 20 MIN: CPT

## 2019-12-10 ENCOUNTER — FORM ENCOUNTER (OUTPATIENT)
Age: 59
End: 2019-12-10

## 2019-12-11 ENCOUNTER — OUTPATIENT (OUTPATIENT)
Dept: OUTPATIENT SERVICES | Facility: HOSPITAL | Age: 59
LOS: 1 days | End: 2019-12-11
Payer: COMMERCIAL

## 2019-12-11 ENCOUNTER — APPOINTMENT (OUTPATIENT)
Dept: RADIOLOGY | Facility: HOSPITAL | Age: 59
End: 2019-12-11
Payer: OTHER MISCELLANEOUS

## 2019-12-11 DIAGNOSIS — Z98.890 OTHER SPECIFIED POSTPROCEDURAL STATES: Chronic | ICD-10-CM

## 2019-12-11 DIAGNOSIS — S99.921A UNSPECIFIED INJURY OF RIGHT FOOT, INITIAL ENCOUNTER: ICD-10-CM

## 2019-12-11 PROCEDURE — 73630 X-RAY EXAM OF FOOT: CPT

## 2019-12-11 PROCEDURE — 73630 X-RAY EXAM OF FOOT: CPT | Mod: 26,RT

## 2019-12-12 ENCOUNTER — APPOINTMENT (OUTPATIENT)
Dept: FAMILY MEDICINE | Facility: CLINIC | Age: 59
End: 2019-12-12
Payer: OTHER MISCELLANEOUS

## 2019-12-12 VITALS — SYSTOLIC BLOOD PRESSURE: 130 MMHG | HEART RATE: 76 BPM | DIASTOLIC BLOOD PRESSURE: 70 MMHG | RESPIRATION RATE: 20 BRPM

## 2019-12-12 DIAGNOSIS — S92.919A UNSPECIFIED FRACTURE OF UNSPECIFIED TOE(S), INITIAL ENCOUNTER FOR CLOSED FRACTURE: ICD-10-CM

## 2019-12-12 PROCEDURE — 99213 OFFICE O/P EST LOW 20 MIN: CPT

## 2019-12-12 NOTE — PHYSICAL EXAM
[No Acute Distress] : no acute distress [Supple] : supple [No Edema] : there was no peripheral edema [Normal] : normal rate, regular rhythm, normal S1 and S2 and no murmur heard [Soft] : abdomen soft [Non Tender] : non-tender [Limping On The Right] : limping on the right [No Focal Deficits] : no focal deficits [Alert and Oriented x3] : oriented to person, place, and time [de-identified] : R 1st toe ecchymotic, indurated

## 2019-12-12 NOTE — PLAN
[FreeTextEntry1] : Buddy-splinted; elevation; warm compresses; activity as tolerated\par Podiatry F/U as needed

## 2020-01-21 ENCOUNTER — OUTPATIENT (OUTPATIENT)
Dept: OUTPATIENT SERVICES | Facility: HOSPITAL | Age: 60
LOS: 1 days | Discharge: HOME | End: 2020-01-21

## 2020-01-21 DIAGNOSIS — M85.80 OTHER SPECIFIED DISORDERS OF BONE DENSITY AND STRUCTURE, UNSPECIFIED SITE: ICD-10-CM

## 2020-01-21 DIAGNOSIS — Z98.890 OTHER SPECIFIED POSTPROCEDURAL STATES: Chronic | ICD-10-CM

## 2020-01-22 LAB
ALBUMIN SERPL ELPH-MCNC: 4.4 G/DL
ALP BLD-CCNC: 76 U/L
ALT SERPL-CCNC: 34 U/L
ANION GAP SERPL CALC-SCNC: 13 MMOL/L
AST SERPL-CCNC: 27 U/L
BASOPHILS # BLD AUTO: 0.03 K/UL
BASOPHILS NFR BLD AUTO: 0.4 %
BILIRUB SERPL-MCNC: 0.6 MG/DL
BUN SERPL-MCNC: 12 MG/DL
CALCIUM SERPL-MCNC: 10.8 MG/DL
CHLORIDE SERPL-SCNC: 103 MMOL/L
CO2 SERPL-SCNC: 22 MMOL/L
CREAT SERPL-MCNC: 0.67 MG/DL
EOSINOPHIL # BLD AUTO: 0.11 K/UL
EOSINOPHIL NFR BLD AUTO: 1.4 %
GLUCOSE SERPL-MCNC: 110 MG/DL
HCT VFR BLD CALC: 43 %
HGB BLD-MCNC: 14 G/DL
IMM GRANULOCYTES NFR BLD AUTO: 0.3 %
LYMPHOCYTES # BLD AUTO: 1.68 K/UL
LYMPHOCYTES NFR BLD AUTO: 21.6 %
MAN DIFF?: NORMAL
MCHC RBC-ENTMCNC: 28.2 PG
MCHC RBC-ENTMCNC: 32.6 GM/DL
MCV RBC AUTO: 86.7 FL
MONOCYTES # BLD AUTO: 0.4 K/UL
MONOCYTES NFR BLD AUTO: 5.2 %
NEUTROPHILS # BLD AUTO: 5.52 K/UL
NEUTROPHILS NFR BLD AUTO: 71.1 %
PLATELET # BLD AUTO: 364 K/UL
POTASSIUM SERPL-SCNC: 4.4 MMOL/L
PROT SERPL-MCNC: 6.9 G/DL
RBC # BLD: 4.96 M/UL
RBC # FLD: 13.2 %
SODIUM SERPL-SCNC: 138 MMOL/L
WBC # FLD AUTO: 7.76 K/UL

## 2020-01-23 ENCOUNTER — APPOINTMENT (OUTPATIENT)
Dept: FAMILY MEDICINE | Facility: CLINIC | Age: 60
End: 2020-01-23
Payer: COMMERCIAL

## 2020-01-23 VITALS — RESPIRATION RATE: 20 BRPM | DIASTOLIC BLOOD PRESSURE: 70 MMHG | HEART RATE: 76 BPM | SYSTOLIC BLOOD PRESSURE: 126 MMHG

## 2020-01-23 VITALS — OXYGEN SATURATION: 98 %

## 2020-01-23 DIAGNOSIS — R07.89 OTHER CHEST PAIN: ICD-10-CM

## 2020-01-23 PROCEDURE — 99213 OFFICE O/P EST LOW 20 MIN: CPT | Mod: 25

## 2020-01-23 PROCEDURE — 93000 ELECTROCARDIOGRAM COMPLETE: CPT

## 2020-01-23 RX ORDER — OMEPRAZOLE 40 MG/1
40 CAPSULE, DELAYED RELEASE ORAL
Qty: 30 | Refills: 0 | Status: DISCONTINUED | COMMUNITY
Start: 2019-09-29

## 2020-01-23 RX ORDER — SULFAMETHOXAZOLE AND TRIMETHOPRIM 800; 160 MG/1; MG/1
800-160 TABLET ORAL
Qty: 6 | Refills: 0 | Status: DISCONTINUED | COMMUNITY
Start: 2019-10-06

## 2020-01-23 NOTE — REVIEW OF SYSTEMS
[Chest Pain] : chest pain [Negative] : Genitourinary [Palpitations] : no palpitations [Orthopnea] : no orthopnea [Paroxysmal Nocturnal Dyspnea] : no paroxysmal nocturnal dyspnea [FreeTextEntry5] : as in HPI

## 2020-01-23 NOTE — PHYSICAL EXAM
[No Acute Distress] : no acute distress [Normal] : normal rate, regular rhythm, normal S1 and S2 and no murmur heard [No Edema] : there was no peripheral edema [Soft] : abdomen soft [Non Tender] : non-tender [No Focal Deficits] : no focal deficits [Alert and Oriented x3] : oriented to person, place, and time [de-identified] : note tenderness over anterior chest wall

## 2020-01-23 NOTE — ASSESSMENT
[FreeTextEntry1] : With normal EKG and chest wall tenderness on exam, feel low index of suspicion for cardiac etiology; feel probably musculoskeletal\par Patient advised to use warm compresses; avoid trauma; can use NSAIDs judiciously 
[phone]

## 2020-01-23 NOTE — HISTORY OF PRESENT ILLNESS
[FreeTextEntry8] : Presents on acute basis over concern about upper chest discomfort.   has chest wall pain that comes and goes; denies associated SOB, palpitations;  has been doing heavy housework and moving furniture.  On questioning  has been experiencing this pain "for a few weeks."

## 2020-01-27 ENCOUNTER — APPOINTMENT (OUTPATIENT)
Dept: INFUSION THERAPY | Facility: HOSPITAL | Age: 60
End: 2020-01-27

## 2020-01-27 ENCOUNTER — APPOINTMENT (OUTPATIENT)
Dept: HEMATOLOGY ONCOLOGY | Facility: CLINIC | Age: 60
End: 2020-01-27
Payer: COMMERCIAL

## 2020-01-27 VITALS
HEART RATE: 91 BPM | TEMPERATURE: 98 F | SYSTOLIC BLOOD PRESSURE: 160 MMHG | WEIGHT: 261.89 LBS | BODY MASS INDEX: 49.49 KG/M2 | OXYGEN SATURATION: 98 % | RESPIRATION RATE: 18 BRPM | DIASTOLIC BLOOD PRESSURE: 77 MMHG

## 2020-01-27 PROCEDURE — 99214 OFFICE O/P EST MOD 30 MIN: CPT

## 2020-01-27 NOTE — PHYSICAL EXAM
[Normal] : affect appropriate [Fully active, able to carry on all pre-disease performance without restriction] : Status 0 - Fully active, able to carry on all pre-disease performance without restriction [de-identified] : Large, pendulous breasts. No new dominant mass, nipple retraction, or discharge.

## 2020-01-27 NOTE — ASSESSMENT
[FreeTextEntry1] : #1) breast cancer-clinically SOPHIE. Patient consents to continue  Aromasin (potential side effect profile reviewed).\par #2) osteopenia-patient to receive Prolia injection today. Slightly elevated calcium noted-repeat calcium level in 2 weeks. Patient holding calcium/vitamin D supplement for now. If refractory hypercalcemia--> further evaluate.\par Patient and her  were given the opportunity to ask questions. Their questions have been answered to their apparent satisfaction at this time.

## 2020-01-27 NOTE — HISTORY OF PRESENT ILLNESS
[Disease: _____________________] : Disease: [unfilled] [T: ___] : T[unfilled] [N: ___] : N[unfilled] [M: ___] : M[unfilled] [AJCC Stage: ____] : AJCC Stage: [unfilled] [de-identified] : 5/2016-Right breast cancer -Stage IA ([id number]), ER positive/[place] positive/HER-2/keke negative. Status post right breast conservation surgery--> RT-->Arimidex begun 10/2016, and discontinued 5/2018 secondary to arthralgias. Began Aromasin 6/2018.\par Osteopenia-receiving biannual Prolia injections on aromatase inhibitor therapy.  [de-identified] : Invasive ductal carcinoma [de-identified] : Oncotype recurrence score of 12, placing patient in low-risk category.\par  [de-identified] : Saw PCP recently for CP-told had costochondritis. Overall, feeling well currently.\par No fevers. No c/o chest pain at present, shortness of breath, change in bowel or bladder habits. No abdominal pain. No c/o bleeding. No dental/gingival pain.\par Sees Dr. Peña in breast surgical f/u and will get scripts for breast imaging from him.\par

## 2020-01-28 DIAGNOSIS — C50.911 MALIGNANT NEOPLASM OF UNSPECIFIED SITE OF RIGHT FEMALE BREAST: ICD-10-CM

## 2020-01-28 DIAGNOSIS — Z17.0 ESTROGEN RECEPTOR POSITIVE STATUS [ER+]: ICD-10-CM

## 2020-02-18 ENCOUNTER — LABORATORY RESULT (OUTPATIENT)
Age: 60
End: 2020-02-18

## 2020-06-30 ENCOUNTER — OUTPATIENT (OUTPATIENT)
Dept: OUTPATIENT SERVICES | Facility: HOSPITAL | Age: 60
LOS: 1 days | Discharge: HOME | End: 2020-06-30

## 2020-06-30 DIAGNOSIS — M85.80 OTHER SPECIFIED DISORDERS OF BONE DENSITY AND STRUCTURE, UNSPECIFIED SITE: ICD-10-CM

## 2020-06-30 DIAGNOSIS — Z98.890 OTHER SPECIFIED POSTPROCEDURAL STATES: Chronic | ICD-10-CM

## 2020-07-02 ENCOUNTER — LABORATORY RESULT (OUTPATIENT)
Age: 60
End: 2020-07-02

## 2020-07-02 LAB — 25(OH)D3 SERPL-MCNC: 33.5 NG/ML

## 2020-07-07 ENCOUNTER — APPOINTMENT (OUTPATIENT)
Dept: INFUSION THERAPY | Facility: HOSPITAL | Age: 60
End: 2020-07-07

## 2020-07-07 ENCOUNTER — APPOINTMENT (OUTPATIENT)
Dept: HEMATOLOGY ONCOLOGY | Facility: CLINIC | Age: 60
End: 2020-07-07
Payer: COMMERCIAL

## 2020-07-07 VITALS
OXYGEN SATURATION: 97 % | RESPIRATION RATE: 18 BRPM | SYSTOLIC BLOOD PRESSURE: 146 MMHG | TEMPERATURE: 98.5 F | DIASTOLIC BLOOD PRESSURE: 78 MMHG | BODY MASS INDEX: 48.74 KG/M2 | HEART RATE: 80 BPM | WEIGHT: 257.94 LBS

## 2020-07-07 DIAGNOSIS — M85.80 OTHER SPECIFIED DISORDERS OF BONE DENSITY AND STRUCTURE, UNSPECIFIED SITE: ICD-10-CM

## 2020-07-07 PROCEDURE — 99213 OFFICE O/P EST LOW 20 MIN: CPT

## 2020-07-07 NOTE — ASSESSMENT
[FreeTextEntry1] : #1) breast cancer-clinically SOPHIE. Lab work reviewed. Patient consents to continue  Aromasin-she feels she is tolerating thus far.\par #2) osteopenia-patient consents to Prolia injection today. \par Patient was given the opportunity to ask questions. Her questions have been answered to her satisfaction at this time.

## 2020-07-07 NOTE — CONSULT LETTER
[Dear  ___] : Dear  [unfilled], [Courtesy Letter:] : I had the pleasure of seeing your patient, [unfilled], in my office today. [Consult Closing:] : Thank you very much for allowing me to participate in the care of this patient.  If you have any questions, please do not hesitate to contact me. [Sincerely,] : Sincerely, [Please see my note below.] : Please see my note below. [DrDeena  ___] : Dr. LOPEZ [FreeTextEntry3] : Loida Johnson MD

## 2020-07-07 NOTE — OB HISTORY
[Menarche Age: ____] : age at menarche was [unfilled] [Post-Menopause, No Sxs] : post-menopausal, currently without symptoms [___] : Total Pregnancies: [unfilled] [Menopause Age: ____] : age at menopause was [unfilled]

## 2020-07-07 NOTE — PHYSICAL EXAM
[Fully active, able to carry on all pre-disease performance without restriction] : Status 0 - Fully active, able to carry on all pre-disease performance without restriction [Normal] : affect appropriate [de-identified] : Large, pendulous breasts. No new dominant mass, or skin retraction.

## 2020-07-08 DIAGNOSIS — C50.919 MALIGNANT NEOPLASM OF UNSPECIFIED SITE OF UNSPECIFIED FEMALE BREAST: ICD-10-CM

## 2020-07-21 ENCOUNTER — TRANSCRIPTION ENCOUNTER (OUTPATIENT)
Age: 60
End: 2020-07-21

## 2020-09-11 ENCOUNTER — APPOINTMENT (OUTPATIENT)
Dept: FAMILY MEDICINE | Facility: CLINIC | Age: 60
End: 2020-09-11
Payer: COMMERCIAL

## 2020-09-11 VITALS
WEIGHT: 258 LBS | SYSTOLIC BLOOD PRESSURE: 120 MMHG | DIASTOLIC BLOOD PRESSURE: 70 MMHG | RESPIRATION RATE: 20 BRPM | BODY MASS INDEX: 48.71 KG/M2 | HEIGHT: 61 IN | HEART RATE: 76 BPM

## 2020-09-11 PROCEDURE — 36415 COLL VENOUS BLD VENIPUNCTURE: CPT

## 2020-09-11 PROCEDURE — 90686 IIV4 VACC NO PRSV 0.5 ML IM: CPT

## 2020-09-11 PROCEDURE — G0008: CPT

## 2020-09-11 PROCEDURE — 99214 OFFICE O/P EST MOD 30 MIN: CPT | Mod: 25

## 2020-09-11 NOTE — PHYSICAL EXAM
[No Acute Distress] : no acute distress [Normal] : no respiratory distress, lungs were clear to auscultation bilaterally and no accessory muscle use [No Edema] : there was no peripheral edema [Soft] : abdomen soft [Non Tender] : non-tender [Normal Anterior Cervical Nodes] : no anterior cervical lymphadenopathy [Normal Posterior Cervical Nodes] : no posterior cervical lymphadenopathy [No Focal Deficits] : no focal deficits [Coordination Grossly Intact] : coordination grossly intact [Alert and Oriented x3] : oriented to person, place, and time [Normal Gait] : normal gait

## 2020-09-11 NOTE — HISTORY OF PRESENT ILLNESS
[de-identified] : Presents for BP check, labs, and general follow-up; also requests flu vaccine.  States feeling generally well.  Denies CP, SOB, palpitations.

## 2020-09-11 NOTE — ASSESSMENT
[FreeTextEntry1] : Hemodynamically stable with acceptable BP\par Cardiac status stable\par No clinical evidence of thyroid dysfunction \par Lab profiles drawn in office and sent\par Flu vaccine given L deltoid

## 2020-09-12 LAB
25(OH)D3 SERPL-MCNC: 30.8 NG/ML
ALBUMIN SERPL ELPH-MCNC: 4.4 G/DL
ALP BLD-CCNC: 66 U/L
ALT SERPL-CCNC: 44 U/L
ANION GAP SERPL CALC-SCNC: 16 MMOL/L
AST SERPL-CCNC: 29 U/L
BASOPHILS # BLD AUTO: 0.03 K/UL
BASOPHILS NFR BLD AUTO: 0.3 %
BILIRUB SERPL-MCNC: 0.3 MG/DL
BUN SERPL-MCNC: 14 MG/DL
CALCIUM SERPL-MCNC: 10.5 MG/DL
CHLORIDE SERPL-SCNC: 103 MMOL/L
CHOLEST SERPL-MCNC: 203 MG/DL
CHOLEST/HDLC SERPL: 4.4 RATIO
CO2 SERPL-SCNC: 20 MMOL/L
CREAT SERPL-MCNC: 0.88 MG/DL
EOSINOPHIL # BLD AUTO: 0.16 K/UL
EOSINOPHIL NFR BLD AUTO: 1.6 %
ESTIMATED AVERAGE GLUCOSE: 117 MG/DL
FOLATE SERPL-MCNC: >20 NG/ML
GLUCOSE SERPL-MCNC: 110 MG/DL
HBA1C MFR BLD HPLC: 5.7 %
HCT VFR BLD CALC: 42.7 %
HDLC SERPL-MCNC: 47 MG/DL
HGB BLD-MCNC: 13.7 G/DL
IMM GRANULOCYTES NFR BLD AUTO: 0.4 %
LDLC SERPL CALC-MCNC: 113 MG/DL
LYMPHOCYTES # BLD AUTO: 2.13 K/UL
LYMPHOCYTES NFR BLD AUTO: 20.8 %
MAN DIFF?: NORMAL
MCHC RBC-ENTMCNC: 28.5 PG
MCHC RBC-ENTMCNC: 32.1 GM/DL
MCV RBC AUTO: 89 FL
MONOCYTES # BLD AUTO: 0.69 K/UL
MONOCYTES NFR BLD AUTO: 6.7 %
NEUTROPHILS # BLD AUTO: 7.21 K/UL
NEUTROPHILS NFR BLD AUTO: 70.2 %
PLATELET # BLD AUTO: 352 K/UL
POTASSIUM SERPL-SCNC: 4.1 MMOL/L
PROT SERPL-MCNC: 6.7 G/DL
RBC # BLD: 4.8 M/UL
RBC # FLD: 13.6 %
SODIUM SERPL-SCNC: 139 MMOL/L
T4 FREE SERPL-MCNC: 1.1 NG/DL
TRIGL SERPL-MCNC: 219 MG/DL
TSH SERPL-ACNC: 3.44 UIU/ML
VIT B12 SERPL-MCNC: 688 PG/ML
WBC # FLD AUTO: 10.26 K/UL

## 2020-12-15 PROBLEM — Z87.09 HISTORY OF ACUTE PHARYNGITIS: Status: RESOLVED | Noted: 2017-10-31 | Resolved: 2020-12-15

## 2020-12-21 PROBLEM — Z87.09 HISTORY OF ACUTE PHARYNGITIS: Status: RESOLVED | Noted: 2019-05-25 | Resolved: 2020-12-21

## 2020-12-21 PROBLEM — Z87.09 HISTORY OF ACUTE BRONCHITIS: Status: RESOLVED | Noted: 2019-05-25 | Resolved: 2020-12-21

## 2020-12-23 ENCOUNTER — APPOINTMENT (OUTPATIENT)
Dept: FAMILY MEDICINE | Facility: CLINIC | Age: 60
End: 2020-12-23
Payer: COMMERCIAL

## 2020-12-23 VITALS
SYSTOLIC BLOOD PRESSURE: 125 MMHG | WEIGHT: 253 LBS | BODY MASS INDEX: 47.77 KG/M2 | RESPIRATION RATE: 20 BRPM | DIASTOLIC BLOOD PRESSURE: 70 MMHG | HEART RATE: 76 BPM | HEIGHT: 61 IN

## 2020-12-23 DIAGNOSIS — Z86.79 PERSONAL HISTORY OF OTHER DISEASES OF THE CIRCULATORY SYSTEM: ICD-10-CM

## 2020-12-23 DIAGNOSIS — E55.9 VITAMIN D DEFICIENCY, UNSPECIFIED: ICD-10-CM

## 2020-12-23 PROCEDURE — 36415 COLL VENOUS BLD VENIPUNCTURE: CPT

## 2020-12-23 PROCEDURE — 99214 OFFICE O/P EST MOD 30 MIN: CPT | Mod: 25

## 2020-12-23 PROCEDURE — 99072 ADDL SUPL MATRL&STAF TM PHE: CPT

## 2020-12-23 NOTE — HISTORY OF PRESENT ILLNESS
[de-identified] : Presents for BP check, labs, and general follow-up.  States feeling generally well; denies CP, SOB, palpitations.  Trying to watch diet - note small but desirable wt loss.

## 2020-12-24 LAB
25(OH)D3 SERPL-MCNC: 46.6 NG/ML
ALBUMIN SERPL ELPH-MCNC: 4.6 G/DL
ALP BLD-CCNC: 78 U/L
ALT SERPL-CCNC: 35 U/L
ANION GAP SERPL CALC-SCNC: 14 MMOL/L
AST SERPL-CCNC: 27 U/L
BASOPHILS # BLD AUTO: 0.03 K/UL
BASOPHILS NFR BLD AUTO: 0.3 %
BILIRUB SERPL-MCNC: 0.4 MG/DL
BUN SERPL-MCNC: 13 MG/DL
CALCIUM SERPL-MCNC: 10.9 MG/DL
CHLORIDE SERPL-SCNC: 103 MMOL/L
CHOLEST SERPL-MCNC: 194 MG/DL
CO2 SERPL-SCNC: 22 MMOL/L
CREAT SERPL-MCNC: 0.78 MG/DL
EOSINOPHIL # BLD AUTO: 0.11 K/UL
EOSINOPHIL NFR BLD AUTO: 1.1 %
ESTIMATED AVERAGE GLUCOSE: 117 MG/DL
FOLATE SERPL-MCNC: >20 NG/ML
GLUCOSE SERPL-MCNC: 87 MG/DL
HBA1C MFR BLD HPLC: 5.7 %
HCT VFR BLD CALC: 41 %
HDLC SERPL-MCNC: 48 MG/DL
HGB BLD-MCNC: 13.7 G/DL
IMM GRANULOCYTES NFR BLD AUTO: 0.2 %
LDLC SERPL CALC-MCNC: 122 MG/DL
LYMPHOCYTES # BLD AUTO: 2.43 K/UL
LYMPHOCYTES NFR BLD AUTO: 25.1 %
MAN DIFF?: NORMAL
MCHC RBC-ENTMCNC: 28.6 PG
MCHC RBC-ENTMCNC: 33.4 GM/DL
MCV RBC AUTO: 85.6 FL
MONOCYTES # BLD AUTO: 0.52 K/UL
MONOCYTES NFR BLD AUTO: 5.4 %
NEUTROPHILS # BLD AUTO: 6.58 K/UL
NEUTROPHILS NFR BLD AUTO: 67.9 %
NONHDLC SERPL-MCNC: 146 MG/DL
PLATELET # BLD AUTO: 351 K/UL
POTASSIUM SERPL-SCNC: 4.4 MMOL/L
PROT SERPL-MCNC: 7.2 G/DL
RBC # BLD: 4.79 M/UL
RBC # FLD: 13.1 %
SODIUM SERPL-SCNC: 139 MMOL/L
T4 FREE SERPL-MCNC: 1.1 NG/DL
TRIGL SERPL-MCNC: 123 MG/DL
TSH SERPL-ACNC: 3.5 UIU/ML
VIT B12 SERPL-MCNC: 724 PG/ML
WBC # FLD AUTO: 9.69 K/UL

## 2021-01-04 ENCOUNTER — OUTPATIENT (OUTPATIENT)
Dept: OUTPATIENT SERVICES | Facility: HOSPITAL | Age: 61
LOS: 1 days | Discharge: HOME | End: 2021-01-04

## 2021-01-04 DIAGNOSIS — Z98.890 OTHER SPECIFIED POSTPROCEDURAL STATES: Chronic | ICD-10-CM

## 2021-01-04 DIAGNOSIS — M85.80 OTHER SPECIFIED DISORDERS OF BONE DENSITY AND STRUCTURE, UNSPECIFIED SITE: ICD-10-CM

## 2021-01-07 ENCOUNTER — APPOINTMENT (OUTPATIENT)
Dept: HEMATOLOGY ONCOLOGY | Facility: CLINIC | Age: 61
End: 2021-01-07
Payer: COMMERCIAL

## 2021-01-07 ENCOUNTER — APPOINTMENT (OUTPATIENT)
Dept: INFUSION THERAPY | Facility: HOSPITAL | Age: 61
End: 2021-01-07

## 2021-01-07 VITALS
DIASTOLIC BLOOD PRESSURE: 76 MMHG | BODY MASS INDEX: 47.38 KG/M2 | HEIGHT: 61.5 IN | HEART RATE: 96 BPM | WEIGHT: 254.19 LBS | TEMPERATURE: 97.2 F | SYSTOLIC BLOOD PRESSURE: 152 MMHG | OXYGEN SATURATION: 96 % | RESPIRATION RATE: 17 BRPM

## 2021-01-07 DIAGNOSIS — D72.829 ELEVATED WHITE BLOOD CELL COUNT, UNSPECIFIED: ICD-10-CM

## 2021-01-07 DIAGNOSIS — C50.919 MALIGNANT NEOPLASM OF UNSPECIFIED SITE OF UNSPECIFIED FEMALE BREAST: ICD-10-CM

## 2021-01-07 PROCEDURE — 99214 OFFICE O/P EST MOD 30 MIN: CPT

## 2021-01-07 PROCEDURE — 99072 ADDL SUPL MATRL&STAF TM PHE: CPT

## 2021-01-07 NOTE — ASSESSMENT
[FreeTextEntry1] : #1) breast cancer-clinically stable. Patient consents to continue  Aromasin-she feels she is tolerating thus far.\par #2) osteopenia-patient consents to Prolia injection today. \par #3) hypercalcemia-discussed differential diagnosis.  Patient to hold calcium/vitamin D supplementation with short interval repeat calcium level/ionized calcium.  If persistent hypercalcemia, further evaluation warranted–may consider bone scan.  PTH level added to next lab work as well.  Prolia as planned.\par \par Patient was given the opportunity to ask questions. Her questions have been answered to her satisfaction at this time.

## 2021-01-07 NOTE — CONSULT LETTER
[Dear  ___] : Dear  [unfilled], [Courtesy Letter:] : I had the pleasure of seeing your patient, [unfilled], in my office today. [Please see my note below.] : Please see my note below. [Consult Closing:] : Thank you very much for allowing me to participate in the care of this patient.  If you have any questions, please do not hesitate to contact me. [Sincerely,] : Sincerely, [FreeTextEntry3] : Loida Johnson MD

## 2021-01-07 NOTE — PHYSICAL EXAM
[Fully active, able to carry on all pre-disease performance without restriction] : Status 0 - Fully active, able to carry on all pre-disease performance without restriction [Normal] : affect appropriate [de-identified] : Large, pendulous breasts. No new dominant mass, or nipple discharge.

## 2021-01-07 NOTE — HISTORY OF PRESENT ILLNESS
[Disease: _____________________] : Disease: [unfilled] [T: ___] : T[unfilled] [N: ___] : N[unfilled] [M: ___] : M[unfilled] [AJCC Stage: ____] : AJCC Stage: [unfilled] [de-identified] : 5/2016-Right breast cancer -Stage IA ([id number]), ER positive/[place] positive/HER-2/keke negative. Status post right breast conservation surgery--> RT-->Arimidex begun 10/2016, and discontinued 5/2018 secondary to arthralgias. Began Aromasin 6/2018.\par Osteopenia-receiving biannual Prolia injections on aromatase inhibitor therapy.  [de-identified] : Invasive ductal carcinoma [de-identified] : Oncotype recurrence score of 12, placing patient in low-risk category.\par  [de-identified] : Has GYN MD appt.1/28/21-plans to get BDT script from him.\par Breast MRI scheduled 2/3/21.\par Mammogram/breast US scheduled 3/2021.\par Sees Dr. Peña in breast surgical f/u 2/16/21.\par Saw PCP-told calcium a little elevated-has been taking calcium 500 mg TIW with vitamin D supplement.\par No fevers. No c/o chest pain, shortness of breath, change in bowel or bladder habits. No abdominal pain. No c/o bleeding. No dental/gingival pain.\par \par \par \par

## 2021-01-21 ENCOUNTER — LABORATORY RESULT (OUTPATIENT)
Age: 61
End: 2021-01-21

## 2021-01-21 ENCOUNTER — NON-APPOINTMENT (OUTPATIENT)
Age: 61
End: 2021-01-21

## 2021-02-16 ENCOUNTER — APPOINTMENT (OUTPATIENT)
Dept: NUCLEAR MEDICINE | Facility: IMAGING CENTER | Age: 61
End: 2021-02-16

## 2021-02-17 LAB
CA-I SERPL-SCNC: 1.26 MMOL/L
CALCIUM SERPL-MCNC: 9.7 MG/DL

## 2021-02-18 LAB
ALBUMIN MFR SERPL ELPH: 57.6 %
ALBUMIN SERPL-MCNC: 3.9 G/DL
ALBUMIN/GLOB SERPL: 1.3 RATIO
ALPHA1 GLOB MFR SERPL ELPH: 4 %
ALPHA1 GLOB SERPL ELPH-MCNC: 0.3 G/DL
ALPHA2 GLOB MFR SERPL ELPH: 12.8 %
ALPHA2 GLOB SERPL ELPH-MCNC: 0.9 G/DL
B-GLOBULIN MFR SERPL ELPH: 13.7 %
B-GLOBULIN SERPL ELPH-MCNC: 0.9 G/DL
GAMMA GLOB FLD ELPH-MCNC: 0.8 G/DL
GAMMA GLOB MFR SERPL ELPH: 11.9 %
INTERPRETATION SERPL IEP-IMP: NORMAL
M PROTEIN SPEC IFE-MCNC: NORMAL
PROT SERPL-MCNC: 6.8 G/DL
PROT SERPL-MCNC: 6.8 G/DL

## 2021-05-14 ENCOUNTER — NON-APPOINTMENT (OUTPATIENT)
Age: 61
End: 2021-05-14

## 2021-07-04 LAB
ALBUMIN SERPL ELPH-MCNC: 4.4 G/DL
ALP BLD-CCNC: 92 U/L
ALT SERPL-CCNC: 36 U/L
ANION GAP SERPL CALC-SCNC: 14 MMOL/L
AST SERPL-CCNC: 33 U/L
BASOPHILS # BLD AUTO: 0.03 K/UL
BASOPHILS NFR BLD AUTO: 0.3 %
BILIRUB SERPL-MCNC: 0.5 MG/DL
BUN SERPL-MCNC: 16 MG/DL
CALCIUM SERPL-MCNC: 10.9 MG/DL
CHLORIDE SERPL-SCNC: 102 MMOL/L
CO2 SERPL-SCNC: 24 MMOL/L
CREAT SERPL-MCNC: 0.69 MG/DL
EOSINOPHIL # BLD AUTO: 0.16 K/UL
EOSINOPHIL NFR BLD AUTO: 1.8 %
GLUCOSE SERPL-MCNC: 115 MG/DL
HCT VFR BLD CALC: 44.6 %
HGB BLD-MCNC: 14.6 G/DL
IMM GRANULOCYTES NFR BLD AUTO: 0.2 %
LYMPHOCYTES # BLD AUTO: 2.01 K/UL
LYMPHOCYTES NFR BLD AUTO: 22.6 %
MAN DIFF?: NORMAL
MCHC RBC-ENTMCNC: 29 PG
MCHC RBC-ENTMCNC: 32.7 GM/DL
MCV RBC AUTO: 88.5 FL
MONOCYTES # BLD AUTO: 0.44 K/UL
MONOCYTES NFR BLD AUTO: 5 %
NEUTROPHILS # BLD AUTO: 6.22 K/UL
NEUTROPHILS NFR BLD AUTO: 70.1 %
PLATELET # BLD AUTO: 338 K/UL
POTASSIUM SERPL-SCNC: 4.9 MMOL/L
PROT SERPL-MCNC: 7 G/DL
RBC # BLD: 5.04 M/UL
RBC # FLD: 13.2 %
SODIUM SERPL-SCNC: 140 MMOL/L
WBC # FLD AUTO: 8.88 K/UL

## 2021-07-06 ENCOUNTER — OUTPATIENT (OUTPATIENT)
Dept: OUTPATIENT SERVICES | Facility: HOSPITAL | Age: 61
LOS: 1 days | Discharge: HOME | End: 2021-07-06

## 2021-07-06 DIAGNOSIS — Z98.890 OTHER SPECIFIED POSTPROCEDURAL STATES: Chronic | ICD-10-CM

## 2021-07-06 DIAGNOSIS — M85.80 OTHER SPECIFIED DISORDERS OF BONE DENSITY AND STRUCTURE, UNSPECIFIED SITE: ICD-10-CM

## 2021-07-07 ENCOUNTER — APPOINTMENT (OUTPATIENT)
Dept: HEMATOLOGY ONCOLOGY | Facility: CLINIC | Age: 61
End: 2021-07-07
Payer: COMMERCIAL

## 2021-07-07 ENCOUNTER — APPOINTMENT (OUTPATIENT)
Dept: INFUSION THERAPY | Facility: HOSPITAL | Age: 61
End: 2021-07-07

## 2021-07-07 VITALS
HEART RATE: 84 BPM | OXYGEN SATURATION: 97 % | HEIGHT: 61.5 IN | BODY MASS INDEX: 46.84 KG/M2 | WEIGHT: 251.33 LBS | RESPIRATION RATE: 18 BRPM | SYSTOLIC BLOOD PRESSURE: 150 MMHG | DIASTOLIC BLOOD PRESSURE: 79 MMHG | TEMPERATURE: 97.2 F

## 2021-07-07 DIAGNOSIS — C50.919 MALIGNANT NEOPLASM OF UNSPECIFIED SITE OF UNSPECIFIED FEMALE BREAST: ICD-10-CM

## 2021-07-07 PROCEDURE — 99072 ADDL SUPL MATRL&STAF TM PHE: CPT

## 2021-07-07 PROCEDURE — 99214 OFFICE O/P EST MOD 30 MIN: CPT

## 2021-07-07 NOTE — HISTORY OF PRESENT ILLNESS
[Disease: _____________________] : Disease: [unfilled] [T: ___] : T[unfilled] [N: ___] : N[unfilled] [M: ___] : M[unfilled] [AJCC Stage: ____] : AJCC Stage: [unfilled] [de-identified] : 5/2016-Right breast cancer -Stage IA ([id number]), ER positive/[place] positive/HER-2/keke negative. Status post right breast conservation surgery--> RT-->Arimidex begun 10/2016, and discontinued 5/2018 secondary to arthralgias. Began Aromasin 6/2018.\par Osteopenia-receiving biannual Prolia injections on aromatase inhibitor therapy.  [de-identified] : Invasive ductal carcinoma [de-identified] : Oncotype recurrence score of 12, placing patient in low-risk category.\par  [de-identified] : Stopped calcium and vitamin D in 2/2021.\par Has decided not to get COVID vaccines.\par Had GYN MD appt.1/28/21-had BDT (Dr. Bhakta)-told was "good."\par Had breast MRI 2/3/21-reported as unremarkable.\par Mammogram/breast US 4/2021-reported as unremarkable.\par No fevers. No c/o chest pain, shortness of breath, change in bowel or bladder habits. No abdominal pain. No c/o bleeding. No c/o bone pain. No dental/gingival pain-has dental cleaning Q 6 months.\par Sees Dr. Peña Q 6 months-sees him again in 8/2021.\par \par \par

## 2021-07-07 NOTE — ASSESSMENT
[FreeTextEntry1] : Lab work, mammogram/breast US, breast MRI results reviewed.\par #1) breast cancer-clinically stable. Patient consents to continue  Aromasin-she feels she is tolerating thus far.\par \par #2) osteopenia-patient consents to Prolia injection today. Have requested a copy of recent BDT report from GYN MD.\par \par #3) hypercalcemia-discussed differential diagnosis.  Patient to continue to hold calcium/vitamin D supplementation. With recurrent hypercalcemia, further evaluation warranted–recommend she reconsider bone scan, which she states she will now do. If bone scan and f/u lab work unrevealing, would recommend f/u with endocrinologist for further evaluation. Prolia as planned.\par \par Patient was given the opportunity to ask questions. Her questions have been answered to her apparent satisfaction at this time.

## 2021-07-07 NOTE — RESULTS/DATA
[FreeTextEntry1] : 4/2021-Mammogram/breast US-no mammographic or sonographic evidence of malignancy.\par 3/2021-breast MRI-no evidence of malignancy.

## 2021-07-07 NOTE — PHYSICAL EXAM
[Fully active, able to carry on all pre-disease performance without restriction] : Status 0 - Fully active, able to carry on all pre-disease performance without restriction [Normal] : affect appropriate [de-identified] : Large, pendulous breasts. No new dominant mass, or nipple discharge.

## 2021-07-19 ENCOUNTER — NON-APPOINTMENT (OUTPATIENT)
Age: 61
End: 2021-07-19

## 2021-07-19 ENCOUNTER — RESULT REVIEW (OUTPATIENT)
Age: 61
End: 2021-07-19

## 2021-07-19 ENCOUNTER — OUTPATIENT (OUTPATIENT)
Dept: OUTPATIENT SERVICES | Facility: HOSPITAL | Age: 61
LOS: 1 days | End: 2021-07-19
Payer: COMMERCIAL

## 2021-07-19 DIAGNOSIS — Z98.890 OTHER SPECIFIED POSTPROCEDURAL STATES: Chronic | ICD-10-CM

## 2021-07-19 DIAGNOSIS — C50.911 MALIGNANT NEOPLASM OF UNSPECIFIED SITE OF RIGHT FEMALE BREAST: ICD-10-CM

## 2021-07-19 PROCEDURE — A9503: CPT

## 2021-07-19 PROCEDURE — 78306 BONE IMAGING WHOLE BODY: CPT | Mod: MH

## 2021-07-19 PROCEDURE — 78306 BONE IMAGING WHOLE BODY: CPT | Mod: 26,MH

## 2021-07-26 ENCOUNTER — LABORATORY RESULT (OUTPATIENT)
Age: 61
End: 2021-07-26

## 2021-07-27 ENCOUNTER — NON-APPOINTMENT (OUTPATIENT)
Age: 61
End: 2021-07-27

## 2021-08-17 ENCOUNTER — OUTPATIENT (OUTPATIENT)
Dept: OUTPATIENT SERVICES | Facility: HOSPITAL | Age: 61
LOS: 1 days | Discharge: HOME | End: 2021-08-17

## 2021-08-17 DIAGNOSIS — M85.80 OTHER SPECIFIED DISORDERS OF BONE DENSITY AND STRUCTURE, UNSPECIFIED SITE: ICD-10-CM

## 2021-08-17 DIAGNOSIS — Z98.890 OTHER SPECIFIED POSTPROCEDURAL STATES: Chronic | ICD-10-CM

## 2021-08-18 ENCOUNTER — APPOINTMENT (OUTPATIENT)
Dept: HEMATOLOGY ONCOLOGY | Facility: CLINIC | Age: 61
End: 2021-08-18
Payer: COMMERCIAL

## 2021-08-18 PROCEDURE — 99213 OFFICE O/P EST LOW 20 MIN: CPT | Mod: 95

## 2021-08-18 NOTE — ASSESSMENT
[FreeTextEntry1] : Lab work, bone scan, bone density results reviewed.\par #1) breast cancer-clinically stable. Patient consents to continue  Aromasin-she feels she is tolerating thus far.\par \par #2) h/o osteopenia-this is 5th year of Prolia-most recent BDT WNL. Discussed fracture risks with holding of further Prolia. Weight bearing exercise, as well as interval f/u BDT (patient plans with GYN MD).\par \par #3) h/o hypercalcemia-reviewed differential diagnosis. Most recent calcium level WNL. Patient restarting calcium 500 mg daily/vitamin D 2000 iu weekly. With h/o recurrent mild hypercalcemia, recommend f/u with endocrinologist regarding this issue if recurrent elevated calcium level. Patient will d/w PCP as well.\par \par Patient was given the opportunity to ask questions. Her questions have been answered to her apparent satisfaction at this time.

## 2021-08-18 NOTE — HISTORY OF PRESENT ILLNESS
[Disease: _____________________] : Disease: [unfilled] [T: ___] : T[unfilled] [N: ___] : N[unfilled] [M: ___] : M[unfilled] [AJCC Stage: ____] : AJCC Stage: [unfilled] [de-identified] : 5/2016-Right breast cancer -Stage IA ([id number]), ER positive/MO positive/HER-2/keke negative. Status post right breast conservation surgery--> RT-->Arimidex begun 10/2016, and discontinued 5/2018 secondary to arthralgias. Began Aromasin 6/2018.\par Osteopenia-received biannual Prolia injections on aromatase inhibitor therapy-last dose 7/2021.  [de-identified] : Invasive ductal carcinoma [de-identified] : Oncotype recurrence score of 12, placing patient in low-risk category.\par  [de-identified] : No fevers. No c/o chest pain, shortness of breath, change in bowel or bladder habits. No abdominal pain. No c/o bleeding. No c/o bone pain. No dental/gingival pain-has dental cleaning Q 6 months.\par Sees Dr. Peña Q 6 months-sees him again tomorrow.\par Sees Dr. Bhakta yearly-and plans to do next BDT under his guidance.\par \par

## 2021-08-18 NOTE — REASON FOR VISIT
[Home] : at home, [unfilled] , at the time of the visit. [Medical Office: ([facility])___] : at the medical office located in  [Verbal consent obtained from patient] : the patient, [unfilled] [Spouse] : spouse [FreeTextEntry2] : breast cancer

## 2021-08-18 NOTE — PHYSICAL EXAM
[Fully active, able to carry on all pre-disease performance without restriction] : Status 0 - Fully active, able to carry on all pre-disease performance without restriction [Normal] : affect appropriate [de-identified] : breathing appeared unlabored [de-identified] : coloration appeared normal

## 2021-08-22 ENCOUNTER — RX RENEWAL (OUTPATIENT)
Age: 61
End: 2021-08-22

## 2021-09-03 ENCOUNTER — RX RENEWAL (OUTPATIENT)
Age: 61
End: 2021-09-03

## 2021-11-12 ENCOUNTER — APPOINTMENT (OUTPATIENT)
Dept: FAMILY MEDICINE | Facility: CLINIC | Age: 61
End: 2021-11-12
Payer: COMMERCIAL

## 2021-11-12 VITALS
DIASTOLIC BLOOD PRESSURE: 75 MMHG | SYSTOLIC BLOOD PRESSURE: 126 MMHG | RESPIRATION RATE: 20 BRPM | HEART RATE: 76 BPM | WEIGHT: 254 LBS | BODY MASS INDEX: 47.34 KG/M2 | HEIGHT: 61.5 IN

## 2021-11-12 DIAGNOSIS — Z23 ENCOUNTER FOR IMMUNIZATION: ICD-10-CM

## 2021-11-12 DIAGNOSIS — Z11.59 ENCOUNTER FOR SCREENING FOR OTHER VIRAL DISEASES: ICD-10-CM

## 2021-11-12 PROCEDURE — 36415 COLL VENOUS BLD VENIPUNCTURE: CPT

## 2021-11-12 PROCEDURE — 99214 OFFICE O/P EST MOD 30 MIN: CPT | Mod: 25

## 2021-11-12 PROCEDURE — 90686 IIV4 VACC NO PRSV 0.5 ML IM: CPT

## 2021-11-12 PROCEDURE — G0008: CPT

## 2021-11-12 NOTE — HISTORY OF PRESENT ILLNESS
[de-identified] : Presents for BP check, labs, and general follow-up; also requests current flu vaccine.  States feeling generally well; continues to try to watch diet.  Note pt requests COVID antibodies with today's labs.

## 2021-11-12 NOTE — ASSESSMENT
[FreeTextEntry1] : Hemodynamically stable with acceptable BP\par No clinical evidence of thyroid dysfunction\par Lab profiles drawn in office and sent\par Flu vaccine given L deltoid

## 2021-11-13 LAB
25(OH)D3 SERPL-MCNC: 32.7 NG/ML
ALBUMIN SERPL ELPH-MCNC: 4.4 G/DL
ALP BLD-CCNC: 68 U/L
ALT SERPL-CCNC: 32 U/L
ANION GAP SERPL CALC-SCNC: 12 MMOL/L
AST SERPL-CCNC: 24 U/L
BASOPHILS # BLD AUTO: 0.03 K/UL
BASOPHILS NFR BLD AUTO: 0.3 %
BILIRUB SERPL-MCNC: 0.4 MG/DL
BUN SERPL-MCNC: 12 MG/DL
CALCIUM SERPL-MCNC: 9.7 MG/DL
CHLORIDE SERPL-SCNC: 105 MMOL/L
CHOLEST SERPL-MCNC: 194 MG/DL
CO2 SERPL-SCNC: 21 MMOL/L
COVID-19 NUCLEOCAPSID  GAM ANTIBODY INTERPRETATION: NEGATIVE
COVID-19 SPIKE DOMAIN ANTIBODY INTERPRETATION: NEGATIVE
CREAT SERPL-MCNC: 0.63 MG/DL
EOSINOPHIL # BLD AUTO: 0.13 K/UL
EOSINOPHIL NFR BLD AUTO: 1.5 %
ESTIMATED AVERAGE GLUCOSE: 117 MG/DL
GLUCOSE SERPL-MCNC: 81 MG/DL
HBA1C MFR BLD HPLC: 5.7 %
HCT VFR BLD CALC: 41.1 %
HDLC SERPL-MCNC: 48 MG/DL
HGB BLD-MCNC: 13.9 G/DL
IMM GRANULOCYTES NFR BLD AUTO: 0.2 %
LDLC SERPL CALC-MCNC: 123 MG/DL
LYMPHOCYTES # BLD AUTO: 2.33 K/UL
LYMPHOCYTES NFR BLD AUTO: 27.1 %
MAN DIFF?: NORMAL
MCHC RBC-ENTMCNC: 30 PG
MCHC RBC-ENTMCNC: 33.8 GM/DL
MCV RBC AUTO: 88.6 FL
MONOCYTES # BLD AUTO: 0.49 K/UL
MONOCYTES NFR BLD AUTO: 5.7 %
NEUTROPHILS # BLD AUTO: 5.59 K/UL
NEUTROPHILS NFR BLD AUTO: 65.2 %
NONHDLC SERPL-MCNC: 146 MG/DL
PLATELET # BLD AUTO: 358 K/UL
POTASSIUM SERPL-SCNC: 4.5 MMOL/L
PROT SERPL-MCNC: 6.8 G/DL
RBC # BLD: 4.64 M/UL
RBC # FLD: 13.1 %
SARS-COV-2 AB SERPL IA-ACNC: 0.4 U/ML
SARS-COV-2 AB SERPL QL IA: 0.08 INDEX
SODIUM SERPL-SCNC: 138 MMOL/L
T4 FREE SERPL-MCNC: 1.1 NG/DL
TRIGL SERPL-MCNC: 117 MG/DL
TSH SERPL-ACNC: 5.09 UIU/ML
WBC # FLD AUTO: 8.59 K/UL

## 2022-01-01 ENCOUNTER — INPATIENT (INPATIENT)
Facility: HOSPITAL | Age: 62
LOS: 3 days | Discharge: HOME | DRG: 177 | End: 2022-01-05
Attending: HOSPITALIST | Admitting: HOSPITALIST
Payer: COMMERCIAL

## 2022-01-01 VITALS
DIASTOLIC BLOOD PRESSURE: 75 MMHG | OXYGEN SATURATION: 90 % | TEMPERATURE: 97 F | RESPIRATION RATE: 17 BRPM | SYSTOLIC BLOOD PRESSURE: 138 MMHG | WEIGHT: 240.08 LBS | HEART RATE: 96 BPM | HEIGHT: 60 IN

## 2022-01-01 DIAGNOSIS — Z98.890 OTHER SPECIFIED POSTPROCEDURAL STATES: Chronic | ICD-10-CM

## 2022-01-01 DIAGNOSIS — U07.1 COVID-19: ICD-10-CM

## 2022-01-01 LAB
ALBUMIN SERPL ELPH-MCNC: 2.9 G/DL — LOW (ref 3.3–5)
ALBUMIN SERPL ELPH-MCNC: 3 G/DL — LOW (ref 3.3–5)
ALP SERPL-CCNC: 56 U/L — SIGNIFICANT CHANGE UP (ref 40–120)
ALP SERPL-CCNC: 58 U/L — SIGNIFICANT CHANGE UP (ref 40–120)
ALT FLD-CCNC: 95 U/L — HIGH (ref 10–45)
ALT FLD-CCNC: 96 U/L — HIGH (ref 10–45)
ANION GAP SERPL CALC-SCNC: 11 MMOL/L — SIGNIFICANT CHANGE UP (ref 5–17)
AST SERPL-CCNC: 84 U/L — HIGH (ref 10–40)
AST SERPL-CCNC: 89 U/L — HIGH (ref 10–40)
BASOPHILS # BLD AUTO: 0.01 K/UL — SIGNIFICANT CHANGE UP (ref 0–0.2)
BASOPHILS NFR BLD AUTO: 0.2 % — SIGNIFICANT CHANGE UP (ref 0–2)
BILIRUB DIRECT SERPL-MCNC: 0.1 MG/DL — SIGNIFICANT CHANGE UP (ref 0–0.3)
BILIRUB INDIRECT FLD-MCNC: 0.5 MG/DL — SIGNIFICANT CHANGE UP (ref 0.2–1)
BILIRUB SERPL-MCNC: 0.5 MG/DL — SIGNIFICANT CHANGE UP (ref 0.2–1.2)
BILIRUB SERPL-MCNC: 0.6 MG/DL — SIGNIFICANT CHANGE UP (ref 0.2–1.2)
BUN SERPL-MCNC: 13 MG/DL — SIGNIFICANT CHANGE UP (ref 7–23)
CALCIUM SERPL-MCNC: 9.8 MG/DL — SIGNIFICANT CHANGE UP (ref 8.4–10.5)
CHLORIDE SERPL-SCNC: 100 MMOL/L — SIGNIFICANT CHANGE UP (ref 96–108)
CO2 SERPL-SCNC: 25 MMOL/L — SIGNIFICANT CHANGE UP (ref 22–31)
CREAT SERPL-MCNC: 0.92 MG/DL — SIGNIFICANT CHANGE UP (ref 0.5–1.3)
D DIMER BLD IA.RAPID-MCNC: 236 NG/ML DDU — HIGH
EOSINOPHIL # BLD AUTO: 0 K/UL — SIGNIFICANT CHANGE UP (ref 0–0.5)
EOSINOPHIL NFR BLD AUTO: 0 % — SIGNIFICANT CHANGE UP (ref 0–6)
GLUCOSE SERPL-MCNC: 169 MG/DL — HIGH (ref 70–99)
HCT VFR BLD CALC: 38.9 % — SIGNIFICANT CHANGE UP (ref 34.5–45)
HGB BLD-MCNC: 13.2 G/DL — SIGNIFICANT CHANGE UP (ref 11.5–15.5)
IMM GRANULOCYTES NFR BLD AUTO: 0.5 % — SIGNIFICANT CHANGE UP (ref 0–1.5)
INR BLD: 1.32 RATIO — HIGH (ref 0.88–1.16)
LYMPHOCYTES # BLD AUTO: 0.83 K/UL — LOW (ref 1–3.3)
LYMPHOCYTES # BLD AUTO: 12.5 % — LOW (ref 13–44)
MCHC RBC-ENTMCNC: 29.4 PG — SIGNIFICANT CHANGE UP (ref 27–34)
MCHC RBC-ENTMCNC: 33.9 GM/DL — SIGNIFICANT CHANGE UP (ref 32–36)
MCV RBC AUTO: 86.6 FL — SIGNIFICANT CHANGE UP (ref 80–100)
MONOCYTES # BLD AUTO: 0.39 K/UL — SIGNIFICANT CHANGE UP (ref 0–0.9)
MONOCYTES NFR BLD AUTO: 5.9 % — SIGNIFICANT CHANGE UP (ref 2–14)
NEUTROPHILS # BLD AUTO: 5.39 K/UL — SIGNIFICANT CHANGE UP (ref 1.8–7.4)
NEUTROPHILS NFR BLD AUTO: 80.9 % — HIGH (ref 43–77)
NRBC # BLD: 0 /100 WBCS — SIGNIFICANT CHANGE UP (ref 0–0)
PLATELET # BLD AUTO: 268 K/UL — SIGNIFICANT CHANGE UP (ref 150–400)
POTASSIUM SERPL-MCNC: 3.9 MMOL/L — SIGNIFICANT CHANGE UP (ref 3.5–5.3)
POTASSIUM SERPL-SCNC: 3.9 MMOL/L — SIGNIFICANT CHANGE UP (ref 3.5–5.3)
PROT SERPL-MCNC: 7.4 G/DL — SIGNIFICANT CHANGE UP (ref 6–8.3)
PROT SERPL-MCNC: 7.4 G/DL — SIGNIFICANT CHANGE UP (ref 6–8.3)
PROTHROM AB SERPL-ACNC: 15.8 SEC — HIGH (ref 10.6–13.6)
RBC # BLD: 4.49 M/UL — SIGNIFICANT CHANGE UP (ref 3.8–5.2)
RBC # FLD: 13 % — SIGNIFICANT CHANGE UP (ref 10.3–14.5)
SARS-COV-2 RNA SPEC QL NAA+PROBE: DETECTED
SODIUM SERPL-SCNC: 136 MMOL/L — SIGNIFICANT CHANGE UP (ref 135–145)
WBC # BLD: 6.65 K/UL — SIGNIFICANT CHANGE UP (ref 3.8–10.5)
WBC # FLD AUTO: 6.65 K/UL — SIGNIFICANT CHANGE UP (ref 3.8–10.5)

## 2022-01-01 PROCEDURE — 99223 1ST HOSP IP/OBS HIGH 75: CPT

## 2022-01-01 PROCEDURE — 71275 CT ANGIOGRAPHY CHEST: CPT | Mod: 26,MA

## 2022-01-01 PROCEDURE — 99285 EMERGENCY DEPT VISIT HI MDM: CPT

## 2022-01-01 PROCEDURE — 93010 ELECTROCARDIOGRAM REPORT: CPT

## 2022-01-01 PROCEDURE — 71045 X-RAY EXAM CHEST 1 VIEW: CPT | Mod: 26

## 2022-01-01 RX ORDER — FLUOXETINE HCL 10 MG
20 CAPSULE ORAL DAILY
Refills: 0 | Status: DISCONTINUED | OUTPATIENT
Start: 2022-01-01 | End: 2022-01-02

## 2022-01-01 RX ORDER — ASPIRIN/CALCIUM CARB/MAGNESIUM 324 MG
81 TABLET ORAL DAILY
Refills: 0 | Status: DISCONTINUED | OUTPATIENT
Start: 2022-01-01 | End: 2022-01-05

## 2022-01-01 RX ORDER — REMDESIVIR 5 MG/ML
100 INJECTION INTRAVENOUS EVERY 24 HOURS
Refills: 0 | Status: DISCONTINUED | OUTPATIENT
Start: 2022-01-02 | End: 2022-01-05

## 2022-01-01 RX ORDER — ENOXAPARIN SODIUM 100 MG/ML
40 INJECTION SUBCUTANEOUS EVERY 12 HOURS
Refills: 0 | Status: DISCONTINUED | OUTPATIENT
Start: 2022-01-01 | End: 2022-01-05

## 2022-01-01 RX ORDER — ACETAMINOPHEN 500 MG
650 TABLET ORAL EVERY 4 HOURS
Refills: 0 | Status: DISCONTINUED | OUTPATIENT
Start: 2022-01-01 | End: 2022-01-05

## 2022-01-01 RX ORDER — REMDESIVIR 5 MG/ML
INJECTION INTRAVENOUS
Refills: 0 | Status: DISCONTINUED | OUTPATIENT
Start: 2022-01-01 | End: 2022-01-05

## 2022-01-01 RX ORDER — REMDESIVIR 5 MG/ML
200 INJECTION INTRAVENOUS EVERY 24 HOURS
Refills: 0 | Status: COMPLETED | OUTPATIENT
Start: 2022-01-01 | End: 2022-01-01

## 2022-01-01 RX ORDER — DEXAMETHASONE 0.5 MG/5ML
6 ELIXIR ORAL DAILY
Refills: 0 | Status: DISCONTINUED | OUTPATIENT
Start: 2022-01-01 | End: 2022-01-05

## 2022-01-01 RX ADMIN — REMDESIVIR 500 MILLIGRAM(S): 5 INJECTION INTRAVENOUS at 18:52

## 2022-01-01 RX ADMIN — ENOXAPARIN SODIUM 40 MILLIGRAM(S): 100 INJECTION SUBCUTANEOUS at 18:52

## 2022-01-01 NOTE — H&P ADULT - NSHPPHYSICALEXAM_GEN_ALL_CORE
Vital Signs Last 24 Hrs  T(F): 97 (01 Jan 2022 12:11), Max: 97 (01 Jan 2022 12:11)  HR: 94 (01 Jan 2022 12:54) (94 - 96)  BP: 137/61 (01 Jan 2022 12:54) (137/61 - 138/75)  RR: 18 (01 Jan 2022 12:54) (17 - 18)  SpO2: 96% (01 Jan 2022 12:54) (90% - 96%)    PHYSICAL EXAM:  GENERAL: NAD, mild respiratory distress  HEAD:  Atraumatic, Normocephalic  EYES: EOMI, conjunctiva and sclera clear  ENMT: No gross hearing impairment, Moist mucous membranes, Good dentition, no thrush  NECK: Supple, No JVD  CHEST/LUNG: coarse breath sounds bilaterally, rhonchi bilaterally, unable to take a full breath without coughing  HEART: RRR; S1/S2, No murmur  ABDOMEN: Soft, Nontender, Nondistended; Bowel sounds present  VASCULAR: Normal pulses, Normal capillary refill  EXTREMITIES: No calf tenderness, No cyanosis, No pitting edema  LYMPH: Normal; No lymphadenopathy noted  SKIN: Warm, Intact, No rashes noted  PSYCH: Normal mood, Normal affect  NERVOUS SYSTEM:  A/O x3, Good concentration; CN 2-12 intact, No focal deficits

## 2022-01-01 NOTE — ED PROVIDER NOTE - PROGRESS NOTE DETAILS
PA Tiberio- d-dimer 236- CTA chest ordered. Walking O2 sat 88% on RA. Will admit. Accepted by Dr. Maynard

## 2022-01-01 NOTE — ED ADULT NURSE NOTE - OBJECTIVE STATEMENT
Patient presents to ED from home complaining of increased congestion. Patient positive for covid, day 12.

## 2022-01-01 NOTE — ED PROVIDER NOTE - CLINICAL SUMMARY MEDICAL DECISION MAKING FREE TEXT BOX
60 y/o Obese F with h/o Breast CA COVID positive 12/22, not vaccinated pw "low oxygen level" and shortness of breath. States she has been monitoring it at home and it went as low as high 80's, low 90's. Denies headache, chest pain, abdominal pain, n/v/d. States  is COVID positive admitted here currently with pneumonia.   PMD- [facility]  Onc- [place]   Plan: Will obtain basic labs with d-dimer, CXR, EKG, and consider admission pending work up

## 2022-01-01 NOTE — ED PROVIDER NOTE - NSICDXPASTMEDICALHX_GEN_ALL_CORE_FT
PAST MEDICAL HISTORY:  Breast cancer, female     Depression     GERD (gastroesophageal reflux disease)     Hypothyroidism     Leukocytosis     Osteopenia

## 2022-01-01 NOTE — ED ADULT NURSE NOTE - NSIMPLEMENTINTERV_GEN_ALL_ED
Implemented All Universal Safety Interventions:  [place] to call system. Call bell, personal items and telephone within reach. Instruct patient to call for assistance. Room bathroom lighting operational. Non-slip footwear when patient is off stretcher. Physically safe environment: no spills, clutter or unnecessary equipment. Stretcher in lowest position, wheels locked, appropriate side rails in place.

## 2022-01-01 NOTE — PATIENT PROFILE ADULT - NSPROEXTENSIONSOFSELF_GEN_A_NUR
"CONTACT:  SUMMER MARTINEZ MSN, APRN  UTILIZATION MANAGEMENT DEPT.  [facility]  [address] [facility], [address]  PHONE:  [phone]  FAX: [phone]    ADDITIONAL CLINICAL    Abby Woo (33 y.o. Female)     Date of Birth Social Security Number Address Home Phone MRN    1987  [address] [facility] [id number] [phone] [id number]    Roman [employer] Marital Status          Shinto Legally        Admission Date Admission Type Admitting Provider Attending Provider Department, Room/Bed    6/5/21 Emergency Nadja Duckworth DO Troxell, Christopher A, DO [place] [address]/    Discharge Date Discharge Disposition Discharge Destination                       Attending Provider: Bart Bhardwaj DO    Allergies: No Known Allergies    Isolation: None   Infection: None   Code Status: CPR    Ht: 160 cm (63\")   Wt: 83.9 kg (185 lb)    Admission Cmt: None   Principal Problem: Pyelonephritis [N12]                 Active Insurance as of 6/5/2021     Primary Coverage     Payor Plan Insurance Group Employer/Plan Group    ANTHEM MEDICAID ANTHEM MEDICAID [id number]     Payor Plan Address Payor Plan Phone Number Payor Plan Fax Number Effective Dates    [address] [address] [phone]  1/1/2020 - None Entered    [facility] [id number]       Subscriber Name Subscriber Birth Date Member ID       ABBY WOO 1987 [id number]                 Emergency Contacts      (Rel.) Home Phone Work Phone Mobile Phone    ESTHER MORRISON (Mother) [phone] -- --            Vital Signs (last day)     Date/Time   Temp   Temp src   Pulse   Resp   BP   Patient Position   SpO2    06/07/21 0600   97.9 (36.6)   Oral   70   18   117/73   Lying   99    06/07/21 0300   97.8 (36.6)   Oral   83   18   129/81   Lying   --    06/06/21 2300   97.8 (36.6)   Oral   74   20   106/67   Lying   98    06/06/21 1800   98.3 (36.8)   Oral   87   20   113/75   Lying   --    06/06/21 " 1752   98.1 (36.7)   Oral   --   --   --   --   --    06/06/21 1651   99.3 (37.4)   Oral   --   --   --   --   --    06/06/21 1615   (!) 102.5 (39.2)   --   --   --   --   --   --    06/06/21 1515   (!) 101.1 (38.4)   Oral   --   --   --   --   --    06/06/21 1431   100.5 (38.1)   Oral   56   20   120/90   Lying   98    06/06/21 1337   97.9 (36.6)   Oral   --   --   122/82   Lying   --    06/06/21 1040   98 (36.7)   Oral   96   20   110/84   Lying   97    06/06/21 0820   98.1 (36.7)   Oral   --   --   --   --   --    06/06/21 0615   99.9 (37.7)   Oral   103   20   124/85   Lying   98    06/06/21 0336   100.1 (37.8)   Oral   88   20   138/90   Lying   97              Facility-Administered Medications as of 6/7/2021   Medication Dose Route Frequency Provider Last Rate Last Admin   • acetaminophen (TYLENOL) tablet 650 mg  650 mg Oral Q6H PRN Rosmery Shah PA-C   650 mg at 06/06/21 1524   • calcium carbonate (TUMS) chewable tablet 500 mg (200 mg elemental)  2 tablet Oral TID PRN Rosmery Shah PA-C       • [COMPLETED] cefTRIAXone (ROCEPHIN) 2 g/100 mL 0.9% NS IVPB (MBP)  2 g Intravenous Once Jose Sparks MD   Stopped at 06/05/21 2046   • cefTRIAXone (ROCEPHIN) 2 g/100 mL 0.9% NS IVPB (MBP)  2 g Intravenous Q24H Nadja Duckworth DO   2 g at 06/06/21 2222   • enoxaparin (LOVENOX) syringe 40 mg  40 mg Subcutaneous Q24H Alex Can MD   40 mg at 06/07/21 0818   • hydrALAZINE (APRESOLINE) injection 10 mg  10 mg Intravenous Q6H PRN Rosmery Shah PA-C       • hydrOXYzine (ATARAX) tablet 25 mg  25 mg Oral TID PRN Rosmery Shah PA-C       • ibuprofen (ADVIL,MOTRIN) tablet 600 mg  600 mg Oral Q6H PRN Alex Can MD   600 mg at 06/07/21 0819   • [COMPLETED] iopamidol (ISOVUE-300) 61 % injection 100 mL  100 mL Intravenous Once in imaging Jose Sparks MD   89 mL at 06/05/21 1921   • [COMPLETED] ketorolac (TORADOL) injection 15 mg  15 mg Intravenous Once Pelon Daniels  TED HWANG   15 mg at 06/05/21 1826   • ketorolac (TORADOL) injection 30 mg  30 mg Intravenous Q6H PRN Nadja Duckworth DO   30 mg at 06/07/21 0304   • lactobacillus acidophilus (RISAQUAD) capsule 1 capsule  1 capsule Oral Daily Rosmery Shah PA-C   1 capsule at 06/07/21 0818   • lidocaine (LIDODERM) 5 % 1 patch  1 patch Transdermal Q24H Latha Vasquez PA       • Magnesium Sulfate 2 gram infusion - Mg less than or equal to 1.5 mg/dL  2 g Intravenous PRN Latha Vasquez PA        Or   • Magnesium Sulfate 1 gram infusion - Mg 1.6-1.9 mg/dL  1 g Intravenous PRN Latha Vasquez PA       • [COMPLETED] Magnesium Sulfate 1 gram infusion - Mg 1.6-1.9 mg/dL  1 g Intravenous Once Conchis Lockett,  mL/hr at 06/06/21 0854 1 g at 06/06/21 0854   • melatonin tablet 5 mg  5 mg Oral Nightly PRN Rosmery Shah PA-C   5 mg at 06/06/21 2133   • morphine injection 2 mg  2 mg Intravenous Q4H PRN Nadja Duckworth DO   2 mg at 06/07/21 0532   • [COMPLETED] morphine injection 4 mg  4 mg Intravenous Once Jose Sparks MD   4 mg at 06/05/21 2022   • nicotine (NICODERM CQ) 14 MG/24HR patch 1 patch  1 patch Transdermal Q24H FREDDIE'Latha Mac PA   1 patch at 06/07/21 0818   • nitroglycerin (NITROSTAT) SL tablet 0.4 mg  0.4 mg Sublingual Q5 Min PRN Latha Vasquez PA       • [COMPLETED] ondansetron (ZOFRAN) injection 4 mg  4 mg Intravenous Once Pelon Daniels PA   4 mg at 06/05/21 1826   • pantoprazole (PROTONIX) EC tablet 40 mg  40 mg Oral Q AM Rosmery Shah PA-C   40 mg at 06/07/21 0529   • Pharmacy to Dose enoxaparin (LOVENOX)   Does not apply Continuous PRN Latha Vasquez PA       • phenazopyridine (PYRIDIUM) tablet 100 mg  100 mg Oral TID With Meals Latha Vasquez PA       • polyethylene glycol (MIRALAX) packet 17 g  17 g Oral Daily PRN Latha Vasquez PA       • potassium chloride (K-DUR,KLOR-CON) CR tablet 40 mEq  40 mEq Oral PRN Latha Vasquez PA        Or   •  potassium chloride (KLOR-CON) packet 40 mEq  40 mEq Oral PRN Latha Vasquez PA        Or   • potassium chloride 10 mEq in 100 mL IVPB  10 mEq Intravenous Q1H PRN Latha Vasquez PA       • [COMPLETED] potassium chloride (K-DUR,KLOR-CON) CR tablet 40 mEq  40 mEq Oral Q4H Conchis Lockett Alred, RPH   40 mEq at 06/06/21 1331   • prochlorperazine (COMPAZINE) injection 5 mg  5 mg Intravenous Q6H PRN Nadja Duckworth DO   5 mg at 06/06/21 1650   • [COMPLETED] sodium chloride 0.9 % bolus 1,000 mL  1,000 mL Intravenous Once Pelon Daniels PA   Stopped at 06/05/21 1930   • sodium chloride 0.9 % flush 10 mL  10 mL Intravenous PRN Nadja Duckworth DO       • sodium chloride 0.9 % flush 10 mL  10 mL Intravenous Q12H Nadja Duckworth DO   10 mL at 06/06/21 2142   • sodium chloride 0.9 % flush 10 mL  10 mL Intravenous PRN Nadja Duckworth DO       • sodium chloride 0.9 % infusion  125 mL/hr Intravenous Continuous Nadja Duckworth  mL/hr at 06/07/21 0828 125 mL/hr at 06/07/21 0828     Lab Results (last 24 hours)     Procedure Component Value Units Date/Time    Urine Culture - Urine, Urine, Clean Catch [[id number]]  (Abnormal)  (Susceptibility) Collected: 06/05/21 1816    Specimen: Urine, Clean Catch Updated: 06/07/21 0809     Urine Culture >100,000 CFU/mL Escherichia coli    Susceptibility      Escherichia coli      MALIK      Ampicillin Resistant      Ampicillin + Sulbactam Resistant      Cefazolin Resistant      Cefepime Susceptible      Ceftazidime Susceptible      Ceftriaxone Susceptible      Gentamicin Susceptible      Levofloxacin Susceptible      Nitrofurantoin Susceptible      Piperacillin + Tazobactam Intermediate      Tetracycline Susceptible      Trimethoprim + Sulfamethoxazole Susceptible               Linear View                   Phosphorus [[id number]]  (Normal) Collected: 06/07/21 0031    Specimen: Blood Updated: 06/07/21 0106     Phosphorus 2.5 mg/dL     Comprehensive Metabolic  Panel [[id number]]  (Abnormal) Collected: 06/07/21 0031    Specimen: Blood Updated: 06/07/21 0104     Glucose 135 mg/dL      BUN 8 mg/dL      Creatinine 0.76 mg/dL      Sodium 137 mmol/L      Potassium 4.5 mmol/L      Chloride 109 mmol/L      CO2 19.3 mmol/L      Calcium 8.5 mg/dL      Total Protein 5.9 g/dL      Albumin 2.79 g/dL      ALT (SGPT) 12 U/L      AST (SGOT) 14 U/L      Alkaline Phosphatase 74 U/L      Total Bilirubin <0.2 mg/dL      eGFR Non African Amer 87 mL/min/1.73      Globulin 3.1 gm/dL      A/G Ratio 0.9 g/dL      BUN/Creatinine Ratio 10.5     Anion Gap 8.7 mmol/L     Narrative:      GFR Normal >60  Chronic Kidney Disease <60  Kidney Failure <15      C-reactive Protein [[id number]]  (Abnormal) Collected: 06/07/21 0031    Specimen: Blood Updated: 06/07/21 0104     C-Reactive Protein 17.13 mg/dL     Magnesium [[id number]]  (Normal) Collected: 06/07/21 0031    Specimen: Blood Updated: 06/07/21 0104     Magnesium 2.1 mg/dL     CBC & Differential [[id number]]  (Abnormal) Collected: 06/07/21 0031    Specimen: Blood Updated: 06/07/21 0043    Narrative:      The following orders were created for panel order CBC & Differential.  Procedure                               Abnormality         Status                     ---------                               -----------         ------                     CBC Auto Differential[[id number]]        Abnormal            Final result                 Please view results for these tests on the individual orders.    CBC Auto Differential [[id number]]  (Abnormal) Collected: 06/07/21 0031    Specimen: Blood Updated: 06/07/21 0043     WBC 13.23 10*3/mm3      RBC 3.81 10*6/mm3      Hemoglobin 10.7 g/dL      Hematocrit 33.2 %      MCV 87.1 fL      MCH 28.1 pg      MCHC 32.2 g/dL      RDW 13.4 %      RDW-SD 42.6 fl      MPV 10.7 fL      Platelets 213 10*3/mm3      Neutrophil % 73.2 %      Lymphocyte % 15.9 %      Monocyte % 9.3 %      Eosinophil % 0.9 %      Basophil % 0.3 %       Immature Grans % 0.4 %      Neutrophils, Absolute 9.69 10*3/mm3      Lymphocytes, Absolute 2.10 10*3/mm3      Monocytes, Absolute 1.23 10*3/mm3      Eosinophils, Absolute 0.12 10*3/mm3      Basophils, Absolute 0.04 10*3/mm3      Immature Grans, Absolute 0.05 10*3/mm3      nRBC 0.0 /100 WBC     Blood Culture - Blood, Hand, Right [[id number]] Collected: 21    Specimen: Blood from Hand, Right Updated: 21     Blood Culture No growth at 24 hours    Blood Culture - Blood, Arm, Left [[id number]] Collected: 21    Specimen: Blood from Arm, Left Updated: 21     Blood Culture No growth at 24 hours    Potassium [[id number]]  (Normal) Collected: 21 1740    Specimen: Blood Updated: 21 1816     Potassium 4.1 mmol/L         Imaging Results (Last 24 Hours)     ** No results found for the last 24 hours. **        Orders (last 24 hrs)      Start     Ordered    21 0826  Ambulate In [place]  Every Shift      21 0825                   Physician Progress Notes (last 24 hours) (Notes from 21 0912 through 21 0912)      Latha Vasquez PA at 21 0727                                    [facility] Medicine Services  PROGRESS NOTE     Patient Identification:  Name:  Kacy Woo  Age:  33 y.o.  Sex:  female  :  1987  MRN:  [id number]  Visit Number:  [id number]  Primary Care Provider:  Provider, No Known    Length of stay:  2    ----------------------------------------------------------------------------------------------------------------------  Subjective     Chief Complaint:  Follow up for sepsis, UTI, pyelonephritis, electrolyte abnormalities, QTc prolongation    History of Presenting Illness/Hospital Course:  Patient is a 37 yo female with past medical history significant for essential hypertension, chronic hepatitis C, anxiety/depression, and ongoing tobacco abuse that presented to the [facility] emergency department  on 6/5/2021 for evaluation of fever, chills, and dysuria. Please see admitting history and physical for further details. Patient was found to have sepsis, present on admission, secondary to acute UTI with probable left pyelonephritis with CVA tenderness, CT without perinephric stranding/hydroureter/hydronephrosis. Blood cultures pending with NGTD. Urine culture with preliminary results of E. Coli. Patient empirically treated with high dose IV rocephin. Gentle IV fluids continued with PRN antipyretics and antiemetics. Electrolytes replaced per protocol.     Subjective:  Today, the patient was on a bed for my evaluation this morning, appears to feel unwell but in no acute distress nontoxic.  No family present bedside.  No overnight events reported per AM SILVERIO Yarbrough.  No fever overnight.  Patient reports to feel unwell, but states she is overall improved from time of admission.  She reports urinary frequency and hesitancy, she reports significant dysuria.  She continues to report left flank pain and suprapubic pain, slightly improved.  She does report some nausea, no vomiting.  She reports no bowel movement since last Thursday.  She denies any fevers or chills.  She does report a mild headache, no visual disturbances.  She denies any chest pain or dyspnea, no cough.  Denies any respiratory symptoms.  Denies any dizziness or LOC.    Present during exam: N/A   ----------------------------------------------------------------------------------------------------------------------  Objective     Consults:  • None     Procedures:  • None      Current Hospital Meds:  cefTRIAXone, 2 g, Intravenous, Q24H  enoxaparin, 40 mg, Subcutaneous, Q24H  lactobacillus acidophilus, 1 capsule, Oral, Daily  nicotine, 1 patch, Transdermal, Q24H  pantoprazole, 40 mg, Oral, Q AM  sodium chloride, 10 mL, Intravenous, Q12H      Pharmacy to Dose enoxaparin (LOVENOX),   sodium chloride, 125 mL/hr, Last Rate: 125 mL/hr (06/06/21  1524)      ----------------------------------------------------------------------------------------------------------------------  Vital Signs:  Temp:  [97.8 °F (36.6 °C)-102.5 °F (39.2 °C)] 97.9 °F (36.6 °C)  Heart Rate:  [56-96] 70  Resp:  [18-20] 18  BP: (106-129)/(67-90) 117/73  Mean Arterial Pressure (Non-Invasive) for the past 24 hrs (Last 3 readings):   Noninvasive MAP (mmHg)   06/06/21 1337 97     SpO2 Percentage    06/06/21 1431 06/06/21 2300 06/07/21 0600   SpO2: 98% 98% 99%     SpO2:  [97 %-99 %] 99 %  on   ;   Device (Oxygen Therapy): room air    Body mass index is 32.77 kg/m².  Wt Readings from Last 3 Encounters:   06/05/21 83.9 kg (185 lb)   09/30/20 76.2 kg (168 lb)   09/08/20 71.7 kg (158 lb)        Intake/Output Summary (Last 24 hours) at 6/7/2021 0797  Last data filed at 6/7/2021 0534  Gross per 24 hour   Intake 3599.84 ml   Output --   Net 3599.84 ml     Diet Regular  ----------------------------------------------------------------------------------------------------------------------  Physical exam:  Physical Exam  Nursing note reviewed.   Constitutional:       General: She is awake. She is not in acute distress.     Appearance: She is well-developed. She is obese. She is not toxic-appearing.      Comments: Lying in bed, no acute distress noted.  Patient appears to feel unwell.  On room air.  No family present at bedside.   HENT:      Head: Normocephalic and atraumatic.      Mouth/Throat:      Mouth: Mucous membranes are moist.   Eyes:      Conjunctiva/sclera: Conjunctivae normal.      Pupils: Pupils are equal, round, and reactive to light.   Cardiovascular:      Rate and Rhythm: Normal rate and regular rhythm.      Pulses:           Dorsalis pedis pulses are 2+ on the right side and 2+ on the left side.      Heart sounds: Normal heart sounds. No murmur heard.   No friction rub. No gallop.    Pulmonary:      Effort: Pulmonary effort is normal. No respiratory distress.      Breath sounds: Normal  breath sounds and air entry. No wheezing, rhonchi or rales.      Comments: Speaks in full sentences without dyspnea, on room air.  Abdominal:      General: Bowel sounds are normal. There is no distension.      Palpations: Abdomen is soft.      Tenderness: There is generalized abdominal tenderness (Slightly improved ) and tenderness in the suprapubic area. There is left CVA tenderness (Improved ). There is no guarding or rebound.      Comments: No peritoneal signs   Genitourinary:     Comments: No odom catheter in place.  Musculoskeletal:      Cervical back: Normal range of motion and neck supple.      Right lower leg: No edema.      Left lower leg: No edema.   Skin:     General: Skin is warm and dry.      Capillary Refill: Capillary refill takes less than 2 seconds.      Findings: No abscess, erythema, lesion or wound.   Neurological:      General: No focal deficit present.      Mental Status: She is alert and oriented to person, place, and time.      Cranial Nerves: Cranial nerves are intact.      Sensory: Sensation is intact.      Motor: Motor function is intact.      Comments: Awake and alert. Follows commands. Answers questions appropriately. Moves all extremities equally. Strength and sensation intact. No focal neuro deficit on exam.   Psychiatric:         Attention and Perception: Attention normal.         Mood and Affect: Mood and affect normal.         Speech: Speech normal.         Behavior: Behavior normal. Behavior is cooperative.         Thought Content: Thought content normal.         Cognition and Memory: Cognition and memory normal.         Judgment: Judgment normal.        ----------------------------------------------------------------------------------------------------------------------  Tele:    Sinus 80s     I have personally reviewed the EKG/Telemetry strips    ----------------------------------------------------------------------------------------------------------------------  ----------------------------------------------------------------------------------------------------------------------  Assessment/Plan     -Sepsis, present on admission, secondary to acute urinary tract infection with concern for left sided pyelonephritis with CVA tenderness  -Horseshoe kidney  • CT without perinephric stranding/hydronephrotis/hydroureter, but symptomatic with left CVA tenderness of exam, treat for pyelo  • Blood cultures pending x 2 with NGTD   • Urine culture with preliminary growth of E. Coli  • CRP increased but leukocytosis resolved  • No fever overnight   • Continue empiric coverage with high dose IV Rocephin   • Lactobacillus for gut pierre protection   • PRN antipyretics for fever, monitor temperature curve closely    • IV fluids on board   • PRN pain medication with holding parameters   • Add pyridium for dysuria, max 2 days treatment   • Add lidoderm patch to left flank    • Repeat CBC and CRP in AM    -Enteritis   · CT of abdomen with multiple fluid filled and minimally dilated loops of small bowel with enhancement of small bowel wall, suggesting enteritis.  · No current diarrhea  · Provide supportive care fore now   · Should patient develop further diarrhea, will send GI panel   · PRN antiemetics, gentle IV fluids     -Normocytic anemia   · Hemoglobin dropped overnight from 12.6 to 10.7  · No active bleeding noted or reported   · Likely hemodilution in setting of IV fluids   · Monitor H&H closely, transfuse if necessary   Repeat CBC in AM    -Hypokalemia, resolved with supplementation  -Hypomagnesemia, resolved with supplementation   -Mild QTc prolongation   · Continue to replace electrolytes per protocol as necessary   · Avoid any further QTc prolonging agents as much as possible   · QTc remains prolonged this AM at 475 m/s   · Telemetry monitoring    · Repeat chem  panel, mag level, and ECG in AM    -Mild hyponatremia, resolved   · Continue gentle IVF  · Monitor closely, repeat chem panel in AM    -Mild hyperglycemia without hx of diabetes mellitus  • HgbA1C 5.6, no further work up necessary     -Essential hypertension   · BP initially labile on presentation with improved control   · Continue to hold home lisinopril currently in setting of sepsis, UTI  · IV fluids on board  · Closely monitor BP per hospital protocol, titrate medications as necessary   · PRN hydralazine IV available     -Chronic hepatitis C   · LFTs stable   · Monitor closely   · Encourage outpatient GI follow up for possible treatment     -Anxiety   -Depression   · Supportive care     -Tobacco abuse   · Encourage cessation   · NRT replacement available     -Hypoalbuminemia   · Multifactorial   · Monitor closely     -Obesity by BMI, 32.77    --------------------------------------------------  DVT Prophylaxis: SQ lovenox   GI Prophylaxis: PPI   FEN: Gentle IV fluids. Replace electrolytes per protocol as necessary. Regular diet.  Activity: As tolerated   --------------------------------------------------  Disposition:  • Plans on home at discharge   • Pending urine culture finalization, remains on IV antibiotics   • 24-48 hours discharge home  --------------------------------------------------    The patient is high risk due to the following diagnoses/reasons: Sepsis, UTI, pyelonephritis, electrolyte abnormalities, QTc prolongation, HTN, hepatitis C, ongoing tobacco abuse, obesity     I have discussed the patient's assessment and plan with the patient, AM SILVERIO Yarbrough, and attending physician Bart Shetty, *    Latha Vasquez PA-C  Hospitalist Service -- [facility]   Pager: [phone]    06/07/21  07:27 EDT    Attending Physician: Bart Bhardwaj,  *    ----------------------------------------------------------------------------------------------------------------------        Electronically signed by Latha Vasquez PA at 06/07/21 0815        eyeglasses

## 2022-01-01 NOTE — H&P ADULT - NSHPLABSRESULTS_GEN_ALL_CORE
LABS:                        13.2   6.65  )-----------( 268      ( 01 Jan 2022 13:10 )             38.9     01-01    136  |  100  |  13  ----------------------------<  169<H>  3.9   |  25  |  0.92    Ca    9.8      01 Jan 2022 13:10    TPro  7.4  /  Alb  2.9<L>  /  TBili  0.5  /  DBili  x   /  AST  84<H>  /  ALT  96<H>  /  AlkPhos  58  01-01    CAPILLARY BLOOD GLUCOSE    RADIOLOGY & ADDITIONAL TESTS:    Consultant(s) Notes Reviewed:  [x ] YES  [ ] NO  Care Discussed with Consultants/Other Providers [ x] YES  [ ] NO  Imaging Personally Reviewed:  [ ] YES  [ ] NO

## 2022-01-01 NOTE — ED PROVIDER NOTE - ATTENDING CONTRIBUTION TO CARE
Raman with TEAGAN Romero. 62 y/o Obese F with h/o Breast CA COVID positive 12/22, not vaccinated pw "low oxygen level" and shortness of breath. States she has been monitoring it at home and it went as low as high 80's, low 90's. Denies headache, chest pain, abdominal pain, n/v/d. States  is COVID positive admitted here currently with pneumonia.   PMD- Shinder  Onc- [place]   Plan: Will obtain basic labs with d-dimer, CXR, EKG, and consider admission pending work up    I performed a face to face bedside interview with patient regarding history of present illness, review of symptoms and past medical history. I completed an independent physical exam.  I have discussed the patient's plan of care with Physician Assistant (PA). I agree with note as stated above, having amended the EMR as needed to reflect my findings.   This includes History of Present Illness, HIV, Past Medical/Surgical/Family/Social History, Allergies and Home Medications, Review of Systems, Physical Exam, and any Progress Notes during the time I functioned as the attending physician for this patient.

## 2022-01-01 NOTE — H&P ADULT - HISTORY OF PRESENT ILLNESS
61W PMH h/o breast cancer, depression presents for shortness of breath.  Patient was initially diagnosed with COVID 19 pneumonia 12/22/21 and  is currently admitted at [facility] for covid 19 pneumonia.  Patient has been having worsening SOB and so she decided to come in today.     Not vaccinated.

## 2022-01-01 NOTE — PATIENT PROFILE ADULT - FALL HARM RISK - HARM RISK INTERVENTIONS

## 2022-01-01 NOTE — H&P ADULT - ASSESSMENT
61W PMH h/o breast cancer, depression presents for shortness of breath found to have COVID 19 pneumonia with acute hypoxic respiratory failure.    COVID 19 pneumonia with acute hypoxic respiratory failure  - start decadron and remdesivir (1/1/22)  - monitor cbc/CMP  - supplemental O2 with 2 L NC  - titrate off O2 if tolerated    Depression  - cont fluoxetine    DVT PPx  - lovenox BID    IMPROVE VTE Individual Risk Assessment          RISK                                                          Points  [  ] Previous VTE                                                3  [  ] Thrombophilia                                             2  [  ] Lower limb paralysis                                   2        (unable to hold up >15 seconds)    [  ] Current Cancer                                             2         (within 6 months)  [ X ] Immobilization > 24 hrs                              1  [  ] ICU/CCU stay > 24 hours                             1  [X  ] Age > 60                                                         1    IMPROVE VTE Score:         [      1   ]    Total Risk Factor Score:    0 - 1:   Consider IPC  >2 - 3:  Thromboprophylaxis required (enoxaparin or SQ heparin)        >4:   High Risk: Thromboprophylaxis required (enoxaparin or SQ heparin), optional add IPC  **If CONTRAINDICATION to enoxaparin or SQ heparin, USE IPCs**

## 2022-01-02 DIAGNOSIS — F33.9 MAJOR DEPRESSIVE DISORDER, RECURRENT, UNSPECIFIED: ICD-10-CM

## 2022-01-02 DIAGNOSIS — J96.01 ACUTE RESPIRATORY FAILURE WITH HYPOXIA: ICD-10-CM

## 2022-01-02 DIAGNOSIS — U07.1 COVID-19: ICD-10-CM

## 2022-01-02 LAB
A1C WITH ESTIMATED AVERAGE GLUCOSE RESULT: 6 % — HIGH (ref 4–5.6)
ALBUMIN SERPL ELPH-MCNC: 3.1 G/DL — LOW (ref 3.3–5)
ALBUMIN SERPL ELPH-MCNC: 3.1 G/DL — LOW (ref 3.3–5)
ALP SERPL-CCNC: 58 U/L — SIGNIFICANT CHANGE UP (ref 40–120)
ALP SERPL-CCNC: 61 U/L — SIGNIFICANT CHANGE UP (ref 40–120)
ALT FLD-CCNC: 131 U/L — HIGH (ref 10–45)
ALT FLD-CCNC: 134 U/L — HIGH (ref 10–45)
ANION GAP SERPL CALC-SCNC: 9 MMOL/L — SIGNIFICANT CHANGE UP (ref 5–17)
AST SERPL-CCNC: 86 U/L — HIGH (ref 10–40)
AST SERPL-CCNC: 87 U/L — HIGH (ref 10–40)
BILIRUB DIRECT SERPL-MCNC: 0.1 MG/DL — SIGNIFICANT CHANGE UP (ref 0–0.3)
BILIRUB INDIRECT FLD-MCNC: 0.4 MG/DL — SIGNIFICANT CHANGE UP (ref 0.2–1)
BILIRUB SERPL-MCNC: 0.5 MG/DL — SIGNIFICANT CHANGE UP (ref 0.2–1.2)
BILIRUB SERPL-MCNC: 0.5 MG/DL — SIGNIFICANT CHANGE UP (ref 0.2–1.2)
BUN SERPL-MCNC: 13 MG/DL — SIGNIFICANT CHANGE UP (ref 7–23)
CALCIUM SERPL-MCNC: 10.5 MG/DL — SIGNIFICANT CHANGE UP (ref 8.4–10.5)
CHLORIDE SERPL-SCNC: 102 MMOL/L — SIGNIFICANT CHANGE UP (ref 96–108)
CO2 SERPL-SCNC: 28 MMOL/L — SIGNIFICANT CHANGE UP (ref 22–31)
CREAT SERPL-MCNC: 1.07 MG/DL — SIGNIFICANT CHANGE UP (ref 0.5–1.3)
CREAT SERPL-MCNC: 1.09 MG/DL — SIGNIFICANT CHANGE UP (ref 0.5–1.3)
ESTIMATED AVERAGE GLUCOSE: 126 MG/DL — HIGH (ref 68–114)
GLUCOSE SERPL-MCNC: 103 MG/DL — HIGH (ref 70–99)
HCT VFR BLD CALC: 43 % — SIGNIFICANT CHANGE UP (ref 34.5–45)
HCV AB S/CO SERPL IA: 0.12 S/CO — SIGNIFICANT CHANGE UP (ref 0–0.99)
HCV AB SERPL-IMP: SIGNIFICANT CHANGE UP
HGB BLD-MCNC: 14 G/DL — SIGNIFICANT CHANGE UP (ref 11.5–15.5)
INR BLD: 1.37 RATIO — HIGH (ref 0.88–1.16)
MCHC RBC-ENTMCNC: 29.2 PG — SIGNIFICANT CHANGE UP (ref 27–34)
MCHC RBC-ENTMCNC: 32.6 GM/DL — SIGNIFICANT CHANGE UP (ref 32–36)
MCV RBC AUTO: 89.6 FL — SIGNIFICANT CHANGE UP (ref 80–100)
NRBC # BLD: 0 /100 WBCS — SIGNIFICANT CHANGE UP (ref 0–0)
PLATELET # BLD AUTO: 356 K/UL — SIGNIFICANT CHANGE UP (ref 150–400)
POTASSIUM SERPL-MCNC: 4.4 MMOL/L — SIGNIFICANT CHANGE UP (ref 3.5–5.3)
POTASSIUM SERPL-SCNC: 4.4 MMOL/L — SIGNIFICANT CHANGE UP (ref 3.5–5.3)
PROT SERPL-MCNC: 7.8 G/DL — SIGNIFICANT CHANGE UP (ref 6–8.3)
PROT SERPL-MCNC: 7.8 G/DL — SIGNIFICANT CHANGE UP (ref 6–8.3)
PROTHROM AB SERPL-ACNC: 16.3 SEC — HIGH (ref 10.6–13.6)
RBC # BLD: 4.8 M/UL — SIGNIFICANT CHANGE UP (ref 3.8–5.2)
RBC # FLD: 13.1 % — SIGNIFICANT CHANGE UP (ref 10.3–14.5)
SODIUM SERPL-SCNC: 139 MMOL/L — SIGNIFICANT CHANGE UP (ref 135–145)
WBC # BLD: 5.78 K/UL — SIGNIFICANT CHANGE UP (ref 3.8–10.5)
WBC # FLD AUTO: 5.78 K/UL — SIGNIFICANT CHANGE UP (ref 3.8–10.5)

## 2022-01-02 PROCEDURE — 99233 SBSQ HOSP IP/OBS HIGH 50: CPT

## 2022-01-02 RX ORDER — FLUOXETINE HCL 10 MG
60 CAPSULE ORAL DAILY
Refills: 0 | Status: DISCONTINUED | OUTPATIENT
Start: 2022-01-02 | End: 2022-01-03

## 2022-01-02 RX ADMIN — Medication 100 MILLIGRAM(S): at 06:43

## 2022-01-02 RX ADMIN — Medication 6 MILLIGRAM(S): at 06:43

## 2022-01-02 RX ADMIN — Medication 20 MILLIGRAM(S): at 13:06

## 2022-01-02 RX ADMIN — Medication 1 TABLET(S): at 13:06

## 2022-01-02 RX ADMIN — ENOXAPARIN SODIUM 40 MILLIGRAM(S): 100 INJECTION SUBCUTANEOUS at 17:37

## 2022-01-02 RX ADMIN — Medication 40 MILLIGRAM(S): at 15:43

## 2022-01-02 RX ADMIN — Medication 81 MILLIGRAM(S): at 13:06

## 2022-01-02 RX ADMIN — REMDESIVIR 500 MILLIGRAM(S): 5 INJECTION INTRAVENOUS at 17:49

## 2022-01-02 RX ADMIN — ENOXAPARIN SODIUM 40 MILLIGRAM(S): 100 INJECTION SUBCUTANEOUS at 06:44

## 2022-01-02 NOTE — PROGRESS NOTE ADULT - NSPROGADDITIONALINFOA_GEN_ALL_CORE
I have personally interviewed and examined this patient, reviewed pertinent clinical information, and performed the evaluation and management services provided at today's visit for inpatient medical follow up    I am available to discuss any issues related to the medical care of this patient on the unit, or by phone at [phone]

## 2022-01-02 NOTE — PROGRESS NOTE ADULT - ASSESSMENT
61W PMH h/o breast cancer, depression presents for shortness of breath found to have COVID 19 pneumonia with acute hypoxic respiratory failure.    COVID 19 pneumonia with acute hypoxic respiratory failure  - continue decadron D2 and   - continue remdesivir D2(started 1/1/22)  - monitor cbc/CMP  - supplemental O2 with 2 L NC  - titrate off O2 if tolerated  - add hycodan at night     Depression  - cont fluoxetine    DVT PPx  - lovenox BID    IMPROVE VTE Individual Risk Assessment          RISK                                                          Points  [  ] Previous VTE                                                3  [  ] Thrombophilia                                             2  [  ] Lower limb paralysis                                   2        (unable to hold up >15 seconds)    [  ] Current Cancer                                             2         (within 6 months)  [ X ] Immobilization > 24 hrs                              1  [  ] ICU/CCU stay > 24 hours                             1  [X  ] Age > 60                                                         1    IMPROVE VTE Score:         [      1   ]    Total Risk Factor Score:    0 - 1:   Consider IPC  >2 - 3:  Thromboprophylaxis required (enoxaparin or SQ heparin)        >4:   High Risk: Thromboprophylaxis required (enoxaparin or SQ heparin), optional add IPC  **If CONTRAINDICATION to enoxaparin or SQ heparin, USE IPCs**

## 2022-01-02 NOTE — PROGRESS NOTE ADULT - SUBJECTIVE AND OBJECTIVE BOX
Patient is a 61y old  Female who presents with a chief complaint of shortness of breath (01 Jan 2022 15:27)      History, interim events and clinically pertinent issues reviewed; patient interviewed and examined.  She c/o poor sleep quality 2/2 cough at night  Also with SOB but no fevers chills myalgias, diarrhea , night sweats   is admitted previously w/ COVID-19 and is also in hospital    ALLERGIES:  adhesives (Unknown)  Levaquin (Unknown)    MEDICATIONS  (STANDING):  aspirin enteric coated 81 milliGRAM(s) Oral daily  dexAMETHasone  Injectable 6 milliGRAM(s) IV Push daily  enoxaparin Injectable 40 milliGRAM(s) SubCutaneous every 12 hours  FLUoxetine 20 milliGRAM(s) Oral daily  multivitamin 1 Tablet(s) Oral daily  remdesivir  IVPB 100 milliGRAM(s) IV Intermittent every 24 hours  remdesivir  IVPB   IV Intermittent     MEDICATIONS  (PRN):  acetaminophen     Tablet .. 650 milliGRAM(s) Oral every 4 hours PRN Temp greater or equal to 38.5C (101.3F)  benzonatate 100 milliGRAM(s) Oral three times a day PRN Cough    Vital Signs Last 24 Hrs  T(F): 98.8 (02 Jan 2022 05:30), Max: 99.7 (01 Jan 2022 17:15)  HR: 71 (02 Jan 2022 05:30) (71 - 96)  BP: 125/78 (02 Jan 2022 05:30) (125/78 - 138/75)  RR: 17 (02 Jan 2022 05:30) (17 - 18)  SpO2: 98% (02 Jan 2022 05:30) (90% - 98%)  I&O's Summary    01 Jan 2022 07:01  -  02 Jan 2022 07:00  --------------------------------------------------------  IN: 250 mL / OUT: 0 mL / NET: 250 mL      BMI (kg/m2): 46.9 (01-01-22 @ 12:11)          PHYSICAL EXAM:  General: NAD, A/O x 3  ENT: MMM  Neck: Supple, No JVD  Lungs: Clear to auscultation bilaterally  Cardio: RRR, S1/S2, No murmurs  Abdomen: Soft, Nontender, Nondistended; Bowel sounds present  Extremities: No calf tenderness, No pitting edema      LABS:                        14.0   5.78  )-----------( 356      ( 02 Jan 2022 06:11 )             43.0       01-02    x   |  x   |  x   ----------------------------<  x   x    |  x   |  1.09    Ca    10.5      02 Jan 2022 06:11    TPro  7.8  /  Alb  3.1  /  TBili  0.5  /  DBili  0.1  /  AST  86  /  ALT  134  /  AlkPhos  58  01-02     eGFR if Non African American: 54 mL/min/1.73M2 (01-02-22 @ 06:15)  eGFR if African American: 63 mL/min/1.73M2 (01-02-22 @ 06:15)    PT/INR - ( 02 Jan 2022 06:15 )   PT: 16.3 sec;   INR: 1.37 ratio      D-Dimer Assay, Quantitative (01.01.22 @ 14:08)   D-Dimer Assay, Quantitative: 236:                                COVID-19 PCR: Detected (01-01-22 @ 13:10)

## 2022-01-03 LAB
ALBUMIN SERPL ELPH-MCNC: 3.1 G/DL — LOW (ref 3.3–5)
ALP SERPL-CCNC: 60 U/L — SIGNIFICANT CHANGE UP (ref 40–120)
ALT FLD-CCNC: 129 U/L — HIGH (ref 10–45)
ANION GAP SERPL CALC-SCNC: 10 MMOL/L — SIGNIFICANT CHANGE UP (ref 5–17)
AST SERPL-CCNC: 55 U/L — HIGH (ref 10–40)
BASOPHILS # BLD AUTO: 0.01 K/UL — SIGNIFICANT CHANGE UP (ref 0–0.2)
BASOPHILS NFR BLD AUTO: 0.1 % — SIGNIFICANT CHANGE UP (ref 0–2)
BILIRUB SERPL-MCNC: 0.5 MG/DL — SIGNIFICANT CHANGE UP (ref 0.2–1.2)
BUN SERPL-MCNC: 19 MG/DL — SIGNIFICANT CHANGE UP (ref 7–23)
CALCIUM SERPL-MCNC: 10.1 MG/DL — SIGNIFICANT CHANGE UP (ref 8.4–10.5)
CHLORIDE SERPL-SCNC: 103 MMOL/L — SIGNIFICANT CHANGE UP (ref 96–108)
CO2 SERPL-SCNC: 27 MMOL/L — SIGNIFICANT CHANGE UP (ref 22–31)
CREAT SERPL-MCNC: 0.86 MG/DL — SIGNIFICANT CHANGE UP (ref 0.5–1.3)
D DIMER BLD IA.RAPID-MCNC: <150 NG/ML DDU — SIGNIFICANT CHANGE UP
EOSINOPHIL # BLD AUTO: 0 K/UL — SIGNIFICANT CHANGE UP (ref 0–0.5)
EOSINOPHIL NFR BLD AUTO: 0 % — SIGNIFICANT CHANGE UP (ref 0–6)
GLUCOSE SERPL-MCNC: 139 MG/DL — HIGH (ref 70–99)
HCT VFR BLD CALC: 40.6 % — SIGNIFICANT CHANGE UP (ref 34.5–45)
HGB BLD-MCNC: 13.5 G/DL — SIGNIFICANT CHANGE UP (ref 11.5–15.5)
IMM GRANULOCYTES NFR BLD AUTO: 0.6 % — SIGNIFICANT CHANGE UP (ref 0–1.5)
LYMPHOCYTES # BLD AUTO: 0.78 K/UL — LOW (ref 1–3.3)
LYMPHOCYTES # BLD AUTO: 10.8 % — LOW (ref 13–44)
MCHC RBC-ENTMCNC: 29.5 PG — SIGNIFICANT CHANGE UP (ref 27–34)
MCHC RBC-ENTMCNC: 33.3 GM/DL — SIGNIFICANT CHANGE UP (ref 32–36)
MCV RBC AUTO: 88.6 FL — SIGNIFICANT CHANGE UP (ref 80–100)
MONOCYTES # BLD AUTO: 0.2 K/UL — SIGNIFICANT CHANGE UP (ref 0–0.9)
MONOCYTES NFR BLD AUTO: 2.8 % — SIGNIFICANT CHANGE UP (ref 2–14)
NEUTROPHILS # BLD AUTO: 6.17 K/UL — SIGNIFICANT CHANGE UP (ref 1.8–7.4)
NEUTROPHILS NFR BLD AUTO: 85.7 % — HIGH (ref 43–77)
NRBC # BLD: 0 /100 WBCS — SIGNIFICANT CHANGE UP (ref 0–0)
PLATELET # BLD AUTO: 393 K/UL — SIGNIFICANT CHANGE UP (ref 150–400)
POTASSIUM SERPL-MCNC: 4 MMOL/L — SIGNIFICANT CHANGE UP (ref 3.5–5.3)
POTASSIUM SERPL-SCNC: 4 MMOL/L — SIGNIFICANT CHANGE UP (ref 3.5–5.3)
PROT SERPL-MCNC: 7.4 G/DL — SIGNIFICANT CHANGE UP (ref 6–8.3)
RBC # BLD: 4.58 M/UL — SIGNIFICANT CHANGE UP (ref 3.8–5.2)
RBC # FLD: 12.9 % — SIGNIFICANT CHANGE UP (ref 10.3–14.5)
SODIUM SERPL-SCNC: 140 MMOL/L — SIGNIFICANT CHANGE UP (ref 135–145)
WBC # BLD: 7.2 K/UL — SIGNIFICANT CHANGE UP (ref 3.8–10.5)
WBC # FLD AUTO: 7.2 K/UL — SIGNIFICANT CHANGE UP (ref 3.8–10.5)

## 2022-01-03 PROCEDURE — 99232 SBSQ HOSP IP/OBS MODERATE 35: CPT

## 2022-01-03 RX ORDER — FLUOXETINE HCL 10 MG
60 CAPSULE ORAL DAILY
Refills: 0 | Status: DISCONTINUED | OUTPATIENT
Start: 2022-01-04 | End: 2022-01-05

## 2022-01-03 RX ORDER — ALBUTEROL 90 UG/1
2 AEROSOL, METERED ORAL EVERY 4 HOURS
Refills: 0 | Status: DISCONTINUED | OUTPATIENT
Start: 2022-01-03 | End: 2022-01-05

## 2022-01-03 RX ORDER — CHOLECALCIFEROL (VITAMIN D3) 125 MCG
2000 CAPSULE ORAL DAILY
Refills: 0 | Status: DISCONTINUED | OUTPATIENT
Start: 2022-01-03 | End: 2022-01-05

## 2022-01-03 RX ORDER — ASCORBIC ACID 60 MG
1000 TABLET,CHEWABLE ORAL DAILY
Refills: 0 | Status: DISCONTINUED | OUTPATIENT
Start: 2022-01-03 | End: 2022-01-05

## 2022-01-03 RX ADMIN — Medication 81 MILLIGRAM(S): at 16:12

## 2022-01-03 RX ADMIN — Medication 60 MILLIGRAM(S): at 12:55

## 2022-01-03 RX ADMIN — ENOXAPARIN SODIUM 40 MILLIGRAM(S): 100 INJECTION SUBCUTANEOUS at 05:37

## 2022-01-03 RX ADMIN — ENOXAPARIN SODIUM 40 MILLIGRAM(S): 100 INJECTION SUBCUTANEOUS at 17:37

## 2022-01-03 RX ADMIN — REMDESIVIR 500 MILLIGRAM(S): 5 INJECTION INTRAVENOUS at 17:40

## 2022-01-03 RX ADMIN — Medication 2000 UNIT(S): at 22:50

## 2022-01-03 RX ADMIN — Medication 6 MILLIGRAM(S): at 05:37

## 2022-01-03 RX ADMIN — Medication 1000 MILLIGRAM(S): at 22:52

## 2022-01-03 RX ADMIN — Medication 1 TABLET(S): at 12:56

## 2022-01-03 NOTE — PROGRESS NOTE ADULT - ASSESSMENT
61W PMH h/o breast cancer, depression presents for shortness of breath found to have COVID 19 pneumonia with acute hypoxic respiratory failure.    COVID 19 pneumonia with acute hypoxic respiratory failure  - Maintain on airborne isolation.  - Continue with O2 as needed via nasal cannula  - Supplemental O2 at 1L NC currently, titrate to room air with goal O2 sat > 92%  - Obtain daily room air O2 saturations  - Continue decadron D2 (Day 2)  - Continue remdesivir D2 (Day 3)  - Acetaminophen 650 mg PO PRN fever. Limit use of NSAIDs.  - HFA albuterol PRN via MDI  - Labs Testing every 3 days for CRP, Ferritin, Procalcitonin, d-dimer  - hycodan qhs    Depression  - cont fluoxetine    Hypoalbuminemia   - Nutrition eval since having poor oral intake    Transaminitis  - Patient on remdesivir  - Will consider D/C if LFTs continue to increase    DVT PPx  - lovenox BID since BMI > 30    Updated  Derian    61W PMH h/o breast cancer, depression presents for shortness of breath found to have COVID 19 pneumonia with acute hypoxic respiratory failure.    COVID 19 pneumonia with acute hypoxic respiratory failure  - Maintain on airborne isolation.  - Continue with O2 as needed via nasal cannula  - Supplemental O2 at 1L NC currently, titrate to room air with goal O2 sat > 92%  - Obtain daily room air O2 saturations  - Continue decadron D2 (Day 2)  - Continue remdesivir D2 (Day 3)  - Acetaminophen 650 mg PO PRN fever. Limit use of NSAIDs.  - HFA albuterol PRN via MDI  - Labs Testing every 3 days for CRP, Ferritin, Procalcitonin, d-dimer  - hycodan qhs  ADDENDUM:  Took patient off O2 at 2:45pm. She ambulating in the room with no difficulty. O2 dropped to 91% on RM. When she talked walking went up to 94% on RA. Left room with O2 sat at 98% while lying in bed.    Depression  - cont fluoxetine    Hypoalbuminemia   - Nutrition eval since having poor oral intake    Transaminitis  - Patient on remdesivir  - Will consider D/C if LFTs continue to increase    DVT PPx  - lovenox BID since BMI > 30    Updated  Derian

## 2022-01-03 NOTE — PROGRESS NOTE ADULT - SUBJECTIVE AND OBJECTIVE BOX
CC: Patient is a 61y old  Female who presents with a chief complaint of shortness of breath (02 Jan 2022 11:29)      Interval History:  Patient seen and examined at bedside.  No overnight events  No complaints this morning    4 HPI elements (location, quality, severity, duration, timing, context, modifying factors, associated signs and symptoms)  OR Status of 3 chronic/inactive problems    3 HPI elements (location, quality, severity, duration, timing, context, modifying factors, associated signs and symptoms)  OR Status of 3 chronic/inactive problems      ALLERGIES:  adhesives (Unknown)  Levaquin (Unknown)    MEDICATIONS  (STANDING):  aspirin enteric coated 81 milliGRAM(s) Oral daily  dexAMETHasone  Injectable 6 milliGRAM(s) IV Push daily  enoxaparin Injectable 40 milliGRAM(s) SubCutaneous every 12 hours  FLUoxetine 60 milliGRAM(s) Oral daily  hydrocodone/homatropine Syrup 10 milliLiter(s) Oral at bedtime  multivitamin 1 Tablet(s) Oral daily  remdesivir  IVPB 100 milliGRAM(s) IV Intermittent every 24 hours  remdesivir  IVPB   IV Intermittent     MEDICATIONS  (PRN):  acetaminophen     Tablet .. 650 milliGRAM(s) Oral every 4 hours PRN Temp greater or equal to 38.5C (101.3F)  benzonatate 100 milliGRAM(s) Oral three times a day PRN Cough    Vital Signs Last 24 Hrs  T(F): 98.5 (03 Jan 2022 10:56), Max: 99 (02 Jan 2022 21:50)  HR: 78 (03 Jan 2022 10:56) (64 - 80)  BP: 124/65 (02 Jan 2022 21:50) (124/65 - 146/63)  RR: 19 (02 Jan 2022 21:50) (17 - 19)  SpO2: 97% (02 Jan 2022 21:50) (92% - 97%)  I&O's Summary    BMI (kg/m2): 46.9 (01-01-22 @ 12:11)    PHYSICAL EXAM:  GENERAL: NAD  HENT:  Atraumatic, Normocephalic; No tonsillar erythema, exudates, or enlargement; Moist mucous membranes  EYES: EOMI, PERRLA, conjunctiva and sclera clear, no lid-lag; moist conjunctivae  NECK: Supple, No JVD, Normal thyroid, FROM of neck  NERVOUS SYSTEM:  CN II - XII intact; Sensation intact; follows commands  CHEST/LUNG: Clear to percussion bilaterally; No rales, rhonchi, wheezing, or rubs; normal respiratory effort, no intercostal retractions  HEART: Regular rate and rhythm; No murmurs, rubs, or gallops; No pitting edema  ABDOMEN: Soft, Nontender, Nondistended; Bowel sounds present; No HSM or masses  MUSCULOSKELETAL/EXTREMITIES:  2+ Peripheral Pulses, No clubbing or cyanosis; FROM of extremeties (pain, crepitation or contracture)  PSYCH: Appropriate affect, Alert & Oriented x 3, Good Memory; Good insight    LABS:                        13.5   7.20  )-----------( 393      ( 03 Jan 2022 08:30 )             40.6       01-03    140  |  103  |  19  ----------------------------<  139  4.0   |  27  |  0.86    Ca    10.1      03 Jan 2022 08:30    TPro  7.4  /  Alb  3.1  /  TBili  0.5  /  DBili  x   /  AST  55  /  ALT  129  /  AlkPhos  60  01-03     eGFR if Non African American: 73 mL/min/1.73M2 (01-03-22 @ 08:30)  eGFR if : 84 mL/min/1.73M2 (01-03-22 @ 08:30)    PT/INR - ( 02 Jan 2022 06:15 )   PT: 16.3 sec;   INR: 1.37 ratio        COVID-19 PCR: Detected (01-01-22 @ 13:10)      Care Discussed with Consultants/Other Providers: Yes   CC: Patient is a 61y old  Female who presents with a chief complaint of shortness of breath (02 Jan 2022 11:29)      Interval History:  Patient seen and examined at bedside.  No overnight events  Still has cough. She is feeling better    ALLERGIES:  adhesives (Unknown)  Levaquin (Unknown)    MEDICATIONS  (STANDING):  aspirin enteric coated 81 milliGRAM(s) Oral daily  dexAMETHasone  Injectable 6 milliGRAM(s) IV Push daily  enoxaparin Injectable 40 milliGRAM(s) SubCutaneous every 12 hours  FLUoxetine 60 milliGRAM(s) Oral daily  hydrocodone/homatropine Syrup 10 milliLiter(s) Oral at bedtime  multivitamin 1 Tablet(s) Oral daily  remdesivir  IVPB 100 milliGRAM(s) IV Intermittent every 24 hours  remdesivir  IVPB   IV Intermittent     MEDICATIONS  (PRN):  acetaminophen     Tablet .. 650 milliGRAM(s) Oral every 4 hours PRN Temp greater or equal to 38.5C (101.3F)  benzonatate 100 milliGRAM(s) Oral three times a day PRN Cough    Vital Signs Last 24 Hrs  T(F): 98.5 (03 Jan 2022 10:56), Max: 99 (02 Jan 2022 21:50)  HR: 78 (03 Jan 2022 10:56) (64 - 80)  BP: 124/65 (02 Jan 2022 21:50) (124/65 - 146/63)  RR: 19 (02 Jan 2022 21:50) (17 - 19)  SpO2: 97% (02 Jan 2022 21:50) (92% - 97%)  I&O's Summary    BMI (kg/m2): 46.9 (01-01-22 @ 12:11)    PHYSICAL EXAM:  GENERAL: NAD, on NC  HENT:  Atraumatic, Normocephalic; No tonsillar erythema, exudates, or enlargement; Moist mucous membranes  EYES: EOMI, PERRLA, conjunctiva and sclera clear, no lid-lag; moist conjunctivae  NECK: Supple, No JVD, Normal thyroid, FROM of neck  NERVOUS SYSTEM:  CN II - XII intact; Sensation intact; follows commands  CHEST/LUNG: Clear to percussion bilaterally; No rales, rhonchi, wheezing, or rubs; normal respiratory effort, no intercostal retractions  HEART: Regular rate and rhythm; No murmurs, rubs, or gallops; No pitting edema  ABDOMEN: Soft, Nontender, Nondistended; Bowel sounds present; No HSM or masses  MUSCULOSKELETAL/EXTREMITIES:  2+ Peripheral Pulses, No clubbing or cyanosis; FROM of extremities  PSYCH: Appropriate affect, Alert & Oriented x 3, Good Memory; Good insight    LABS:                        13.5   7.20  )-----------( 393      ( 03 Jan 2022 08:30 )             40.6       01-03    140  |  103  |  19  ----------------------------<  139  4.0   |  27  |  0.86    Ca    10.1      03 Jan 2022 08:30    TPro  7.4  /  Alb  3.1  /  TBili  0.5  /  DBili  x   /  AST  55  /  ALT  129  /  AlkPhos  60  01-03     eGFR if Non African American: 73 mL/min/1.73M2 (01-03-22 @ 08:30)  eGFR if : 84 mL/min/1.73M2 (01-03-22 @ 08:30)    PT/INR - ( 02 Jan 2022 06:15 )   PT: 16.3 sec;   INR: 1.37 ratio        COVID-19 PCR: Detected (01-01-22 @ 13:10)      Care Discussed with Consultants/Other Providers: Yes

## 2022-01-04 LAB
ALBUMIN SERPL ELPH-MCNC: 3 G/DL — LOW (ref 3.3–5)
ALBUMIN SERPL ELPH-MCNC: 3 G/DL — LOW (ref 3.3–5)
ALP SERPL-CCNC: 58 U/L — SIGNIFICANT CHANGE UP (ref 40–120)
ALP SERPL-CCNC: 64 U/L — SIGNIFICANT CHANGE UP (ref 40–120)
ALT FLD-CCNC: 143 U/L — HIGH (ref 10–45)
ALT FLD-CCNC: 148 U/L — HIGH (ref 10–45)
ANION GAP SERPL CALC-SCNC: 11 MMOL/L — SIGNIFICANT CHANGE UP (ref 5–17)
AST SERPL-CCNC: 70 U/L — HIGH (ref 10–40)
AST SERPL-CCNC: 73 U/L — HIGH (ref 10–40)
BILIRUB DIRECT SERPL-MCNC: 0.1 MG/DL — SIGNIFICANT CHANGE UP (ref 0–0.3)
BILIRUB INDIRECT FLD-MCNC: 0.3 MG/DL — SIGNIFICANT CHANGE UP (ref 0.2–1)
BILIRUB SERPL-MCNC: 0.4 MG/DL — SIGNIFICANT CHANGE UP (ref 0.2–1.2)
BILIRUB SERPL-MCNC: 0.4 MG/DL — SIGNIFICANT CHANGE UP (ref 0.2–1.2)
BUN SERPL-MCNC: 21 MG/DL — SIGNIFICANT CHANGE UP (ref 7–23)
CALCIUM SERPL-MCNC: 9.6 MG/DL — SIGNIFICANT CHANGE UP (ref 8.4–10.5)
CHLORIDE SERPL-SCNC: 104 MMOL/L — SIGNIFICANT CHANGE UP (ref 96–108)
CO2 SERPL-SCNC: 27 MMOL/L — SIGNIFICANT CHANGE UP (ref 22–31)
CREAT SERPL-MCNC: 0.94 MG/DL — SIGNIFICANT CHANGE UP (ref 0.5–1.3)
CREAT SERPL-MCNC: 0.97 MG/DL — SIGNIFICANT CHANGE UP (ref 0.5–1.3)
GLUCOSE SERPL-MCNC: 120 MG/DL — HIGH (ref 70–99)
HCT VFR BLD CALC: 39.7 % — SIGNIFICANT CHANGE UP (ref 34.5–45)
HGB BLD-MCNC: 13.2 G/DL — SIGNIFICANT CHANGE UP (ref 11.5–15.5)
INR BLD: 1.48 RATIO — HIGH (ref 0.88–1.16)
MAGNESIUM SERPL-MCNC: 2.1 MG/DL — SIGNIFICANT CHANGE UP (ref 1.6–2.6)
MCHC RBC-ENTMCNC: 29.5 PG — SIGNIFICANT CHANGE UP (ref 27–34)
MCHC RBC-ENTMCNC: 33.2 GM/DL — SIGNIFICANT CHANGE UP (ref 32–36)
MCV RBC AUTO: 88.6 FL — SIGNIFICANT CHANGE UP (ref 80–100)
NRBC # BLD: 0 /100 WBCS — SIGNIFICANT CHANGE UP (ref 0–0)
PHOSPHATE SERPL-MCNC: 3.4 MG/DL — SIGNIFICANT CHANGE UP (ref 2.5–4.5)
PLATELET # BLD AUTO: 492 K/UL — HIGH (ref 150–400)
POTASSIUM SERPL-MCNC: 4.4 MMOL/L — SIGNIFICANT CHANGE UP (ref 3.5–5.3)
POTASSIUM SERPL-SCNC: 4.4 MMOL/L — SIGNIFICANT CHANGE UP (ref 3.5–5.3)
PROT SERPL-MCNC: 6.9 G/DL — SIGNIFICANT CHANGE UP (ref 6–8.3)
PROT SERPL-MCNC: 7 G/DL — SIGNIFICANT CHANGE UP (ref 6–8.3)
PROTHROM AB SERPL-ACNC: 17.6 SEC — HIGH (ref 10.6–13.6)
RBC # BLD: 4.48 M/UL — SIGNIFICANT CHANGE UP (ref 3.8–5.2)
RBC # FLD: 13 % — SIGNIFICANT CHANGE UP (ref 10.3–14.5)
SODIUM SERPL-SCNC: 142 MMOL/L — SIGNIFICANT CHANGE UP (ref 135–145)
WBC # BLD: 10.87 K/UL — HIGH (ref 3.8–10.5)
WBC # FLD AUTO: 10.87 K/UL — HIGH (ref 3.8–10.5)

## 2022-01-04 PROCEDURE — 99232 SBSQ HOSP IP/OBS MODERATE 35: CPT

## 2022-01-04 RX ORDER — EXEMESTANE 25 MG/1
25 TABLET, SUGAR COATED ORAL DAILY
Refills: 0 | Status: DISCONTINUED | OUTPATIENT
Start: 2022-01-04 | End: 2022-01-04

## 2022-01-04 RX ORDER — EXEMESTANE 25 MG/1
25 TABLET, SUGAR COATED ORAL DAILY
Refills: 0 | Status: DISCONTINUED | OUTPATIENT
Start: 2022-01-04 | End: 2022-01-05

## 2022-01-04 RX ADMIN — Medication 1000 MILLIGRAM(S): at 14:09

## 2022-01-04 RX ADMIN — Medication 81 MILLIGRAM(S): at 13:53

## 2022-01-04 RX ADMIN — EXEMESTANE 25 MILLIGRAM(S): 25 TABLET, SUGAR COATED ORAL at 19:17

## 2022-01-04 RX ADMIN — Medication 1 TABLET(S): at 13:53

## 2022-01-04 RX ADMIN — Medication 60 MILLIGRAM(S): at 13:53

## 2022-01-04 RX ADMIN — REMDESIVIR 500 MILLIGRAM(S): 5 INJECTION INTRAVENOUS at 18:21

## 2022-01-04 RX ADMIN — Medication 6 MILLIGRAM(S): at 05:50

## 2022-01-04 RX ADMIN — ENOXAPARIN SODIUM 40 MILLIGRAM(S): 100 INJECTION SUBCUTANEOUS at 18:22

## 2022-01-04 RX ADMIN — ENOXAPARIN SODIUM 40 MILLIGRAM(S): 100 INJECTION SUBCUTANEOUS at 05:50

## 2022-01-04 RX ADMIN — Medication 2000 UNIT(S): at 18:21

## 2022-01-04 NOTE — PROGRESS NOTE ADULT - SUBJECTIVE AND OBJECTIVE BOX
CC: Patient is a 61y old  Female who presents with a chief complaint of shortness of breath (03 Jan 2022 12:38)    Interval History:  Patient seen and examined at bedside.  No overnight events  No complaints this morning  Oxygenating well    ALLERGIES:  adhesives (Unknown)  Levaquin (Unknown)    MEDICATIONS  (STANDING):  ascorbic acid 1000 milliGRAM(s) Oral daily  aspirin enteric coated 81 milliGRAM(s) Oral daily  cholecalciferol 2000 Unit(s) Oral daily  dexAMETHasone  Injectable 6 milliGRAM(s) IV Push daily  enoxaparin Injectable 40 milliGRAM(s) SubCutaneous every 12 hours  exemestane 25 milliGRAM(s) Oral daily  FLUoxetine 60 milliGRAM(s) Oral daily  hydrocodone/homatropine Syrup 10 milliLiter(s) Oral at bedtime  multivitamin 1 Tablet(s) Oral daily  remdesivir  IVPB 100 milliGRAM(s) IV Intermittent every 24 hours  remdesivir  IVPB   IV Intermittent     MEDICATIONS  (PRN):  acetaminophen     Tablet .. 650 milliGRAM(s) Oral every 4 hours PRN Temp greater or equal to 38.5C (101.3F)  ALBUTerol    90 MICROgram(s) HFA Inhaler 2 Puff(s) Inhalation every 4 hours PRN Shortness of Breath and/or Wheezing  benzonatate 100 milliGRAM(s) Oral three times a day PRN Cough    Vital Signs Last 24 Hrs  T(F): 97.7 (04 Jan 2022 09:47), Max: 99 (03 Jan 2022 20:00)  HR: 79 (04 Jan 2022 09:47) (75 - 90)  BP: 144/69 (04 Jan 2022 09:47) (120/90 - 144/69)  RR: 18 (04 Jan 2022 11:25) (18 - 21)  SpO2: 93% (04 Jan 2022 11:25) (89% - 99%)  I&O's Summary    BMI (kg/m2): 46.9 (01-01-22 @ 12:11)    PHYSICAL EXAM:  GENERAL: NAD, on RA  HENT:  Atraumatic, Normocephalic; No tonsillar erythema, exudates, or enlargement; Moist mucous membranes  EYES: EOMI, PERRLA, conjunctiva and sclera clear, no lid-lag; moist conjunctivae  NECK: Supple, No JVD, Normal thyroid, FROM of neck  NERVOUS SYSTEM:  CN II - XII intact; Sensation intact; follows commands  CHEST/LUNG: Clear to percussion bilaterally; No rales, rhonchi, wheezing, or rubs; normal respiratory effort, no intercostal retractions  HEART: Regular rate and rhythm; No murmurs, rubs, or gallops; No pitting edema  ABDOMEN: Soft, Nontender, Nondistended; Bowel sounds present; No HSM or masses  MUSCULOSKELETAL/EXTREMITIES:  2+ Peripheral Pulses, No clubbing or cyanosis; FROM of extremeties (pain, crepitation or contracture)  PSYCH: Appropriate affect, Alert & Oriented x 3, Good Memory; Good insight    LABS:                        13.2   10.87 )-----------( 492      ( 04 Jan 2022 05:30 )             39.7       01-04    142  |  104  |  21  ----------------------------<  120  4.4   |  27  |  0.97    Ca    9.6      04 Jan 2022 05:30  Phos  3.4     01-04  Mg     2.1     01-04    TPro  7.0  /  Alb  3.0  /  TBili  0.4  /  DBili  0.1  /  AST  70  /  ALT  148  /  AlkPhos  58  01-04     eGFR if Non African American: 63 mL/min/1.73M2 (01-04-22 @ 05:30)  eGFR if African American: 73 mL/min/1.73M2 (01-04-22 @ 05:30)    PT/INR - ( 04 Jan 2022 05:30 )   PT: 17.6 sec;   INR: 1.48 ratio      COVID-19 PCR: Detected (01-01-22 @ 13:10)      Care Discussed with Consultants/Other Providers: Yes

## 2022-01-04 NOTE — PROGRESS NOTE ADULT - ASSESSMENT
61W PMH h/o breast cancer, depression presents for shortness of breath found to have COVID 19 pneumonia with acute hypoxic respiratory failure.    COVID 19 pneumonia with acute hypoxic respiratory failure  - Maintain on airborne isolation.  - Continue with O2 as needed via nasal cannula. Currently on room air  - Supplemental O2 at 1L NC currently, titrate to room air with goal O2 sat > 92%  - Obtain daily room air O2 saturations  - Continue decadron D2 (Day 3)  - Continue remdesivir D2 (Day 4)  - Acetaminophen 650 mg PO PRN fever. Limit use of NSAIDs.  - HFA albuterol PRN via MDI  - Labs Testing every 3 days for CRP, Ferritin, Procalcitonin, d-dimer  - hycodan qhs  - Patient was ambulated again today without O2. O2 dropped to 92% on RA. She was not tachypneic and did not feel weak.     Depression  - cont fluoxetine    Hypoalbuminemia   - Nutrition eval since having poor oral intake    Transaminitis  - Patient on remdesivir and will continue since stable    Mild leukocytosis  - likely due to steroid    Thrombocytosis  - likely reactive    DVT PPx  - lovenox BID since BMI > 30    Updated  Derian [phone] 1/4    Anticipate D/C tomorrow

## 2022-01-05 ENCOUNTER — TRANSCRIPTION ENCOUNTER (OUTPATIENT)
Age: 62
End: 2022-01-05

## 2022-01-05 VITALS
RESPIRATION RATE: 16 BRPM | SYSTOLIC BLOOD PRESSURE: 130 MMHG | OXYGEN SATURATION: 94 % | DIASTOLIC BLOOD PRESSURE: 67 MMHG | HEART RATE: 71 BPM | TEMPERATURE: 98 F

## 2022-01-05 LAB
ALBUMIN SERPL ELPH-MCNC: 2.8 G/DL — LOW (ref 3.3–5)
ALP SERPL-CCNC: 60 U/L — SIGNIFICANT CHANGE UP (ref 40–120)
ALT FLD-CCNC: 149 U/L — HIGH (ref 10–45)
ANION GAP SERPL CALC-SCNC: 8 MMOL/L — SIGNIFICANT CHANGE UP (ref 5–17)
AST SERPL-CCNC: 54 U/L — HIGH (ref 10–40)
BILIRUB SERPL-MCNC: 0.5 MG/DL — SIGNIFICANT CHANGE UP (ref 0.2–1.2)
BUN SERPL-MCNC: 18 MG/DL — SIGNIFICANT CHANGE UP (ref 7–23)
CALCIUM SERPL-MCNC: 9.4 MG/DL — SIGNIFICANT CHANGE UP (ref 8.4–10.5)
CHLORIDE SERPL-SCNC: 102 MMOL/L — SIGNIFICANT CHANGE UP (ref 96–108)
CO2 SERPL-SCNC: 25 MMOL/L — SIGNIFICANT CHANGE UP (ref 22–31)
CREAT SERPL-MCNC: 0.82 MG/DL — SIGNIFICANT CHANGE UP (ref 0.5–1.3)
GLUCOSE SERPL-MCNC: 142 MG/DL — HIGH (ref 70–99)
HCT VFR BLD CALC: 36.4 % — SIGNIFICANT CHANGE UP (ref 34.5–45)
HGB BLD-MCNC: 12.4 G/DL — SIGNIFICANT CHANGE UP (ref 11.5–15.5)
MAGNESIUM SERPL-MCNC: 2 MG/DL — SIGNIFICANT CHANGE UP (ref 1.6–2.6)
MCHC RBC-ENTMCNC: 29.2 PG — SIGNIFICANT CHANGE UP (ref 27–34)
MCHC RBC-ENTMCNC: 34.1 GM/DL — SIGNIFICANT CHANGE UP (ref 32–36)
MCV RBC AUTO: 85.6 FL — SIGNIFICANT CHANGE UP (ref 80–100)
NRBC # BLD: 0 /100 WBCS — SIGNIFICANT CHANGE UP (ref 0–0)
PHOSPHATE SERPL-MCNC: 2.9 MG/DL — SIGNIFICANT CHANGE UP (ref 2.5–4.5)
PLATELET # BLD AUTO: 464 K/UL — HIGH (ref 150–400)
POTASSIUM SERPL-MCNC: 3.9 MMOL/L — SIGNIFICANT CHANGE UP (ref 3.5–5.3)
POTASSIUM SERPL-SCNC: 3.9 MMOL/L — SIGNIFICANT CHANGE UP (ref 3.5–5.3)
PROT SERPL-MCNC: 6.4 G/DL — SIGNIFICANT CHANGE UP (ref 6–8.3)
RBC # BLD: 4.25 M/UL — SIGNIFICANT CHANGE UP (ref 3.8–5.2)
RBC # FLD: 12.9 % — SIGNIFICANT CHANGE UP (ref 10.3–14.5)
SODIUM SERPL-SCNC: 135 MMOL/L — SIGNIFICANT CHANGE UP (ref 135–145)
WBC # BLD: 12.48 K/UL — HIGH (ref 3.8–10.5)
WBC # FLD AUTO: 12.48 K/UL — HIGH (ref 3.8–10.5)

## 2022-01-05 PROCEDURE — 85027 COMPLETE CBC AUTOMATED: CPT

## 2022-01-05 PROCEDURE — 71045 X-RAY EXAM CHEST 1 VIEW: CPT

## 2022-01-05 PROCEDURE — 82565 ASSAY OF CREATININE: CPT

## 2022-01-05 PROCEDURE — 99239 HOSP IP/OBS DSCHRG MGMT >30: CPT

## 2022-01-05 PROCEDURE — 80076 HEPATIC FUNCTION PANEL: CPT

## 2022-01-05 PROCEDURE — 87635 SARS-COV-2 COVID-19 AMP PRB: CPT

## 2022-01-05 PROCEDURE — 71275 CT ANGIOGRAPHY CHEST: CPT | Mod: MA

## 2022-01-05 PROCEDURE — 93005 ELECTROCARDIOGRAM TRACING: CPT

## 2022-01-05 PROCEDURE — 85025 COMPLETE CBC W/AUTO DIFF WBC: CPT

## 2022-01-05 PROCEDURE — 84100 ASSAY OF PHOSPHORUS: CPT

## 2022-01-05 PROCEDURE — 83036 HEMOGLOBIN GLYCOSYLATED A1C: CPT

## 2022-01-05 PROCEDURE — 36415 COLL VENOUS BLD VENIPUNCTURE: CPT

## 2022-01-05 PROCEDURE — 86803 HEPATITIS C AB TEST: CPT

## 2022-01-05 PROCEDURE — 83735 ASSAY OF MAGNESIUM: CPT

## 2022-01-05 PROCEDURE — 99285 EMERGENCY DEPT VISIT HI MDM: CPT

## 2022-01-05 PROCEDURE — 85610 PROTHROMBIN TIME: CPT

## 2022-01-05 PROCEDURE — 80053 COMPREHEN METABOLIC PANEL: CPT

## 2022-01-05 PROCEDURE — 85379 FIBRIN DEGRADATION QUANT: CPT

## 2022-01-05 RX ORDER — DEXAMETHASONE 0.5 MG/5ML
1 ELIXIR ORAL
Qty: 6 | Refills: 0
Start: 2022-01-05 | End: 2022-01-10

## 2022-01-05 RX ORDER — ALBUTEROL 90 UG/1
2 AEROSOL, METERED ORAL
Qty: 120 | Refills: 0
Start: 2022-01-05 | End: 2022-01-14

## 2022-01-05 RX ORDER — PROMETHAZINE HCL/CODEINE
10 SYRUP ORAL
Qty: 50 | Refills: 0
Start: 2022-01-05 | End: 2022-01-09

## 2022-01-05 RX ADMIN — ENOXAPARIN SODIUM 40 MILLIGRAM(S): 100 INJECTION SUBCUTANEOUS at 05:49

## 2022-01-05 RX ADMIN — Medication 6 MILLIGRAM(S): at 05:49

## 2022-01-05 NOTE — DISCHARGE NOTE PROVIDER - CARE PROVIDER_API CALL
Tigre Jones)  Medicine  [address], [place] [address]  Phone: [phone]  Fax: [phone]  Established Patient  Follow Up Time:

## 2022-01-05 NOTE — DISCHARGE NOTE PROVIDER - NSDCCPCAREPLAN_GEN_ALL_CORE_FT
PRINCIPAL DISCHARGE DIAGNOSIS  Diagnosis: COVID-19  Assessment and Plan of Treatment: Continue to take oral steroids until they finish on jan 10th, 2022      SECONDARY DISCHARGE DIAGNOSES  Diagnosis: Hypoxia  Assessment and Plan of Treatment: Resolved. Take your time walking around. Rest when feeling winded

## 2022-01-05 NOTE — DISCHARGE NOTE NURSING/CASE MANAGEMENT/SOCIAL WORK - NSDCPEFALRISK_GEN_ALL_CORE
For information on Fall & Injury Prevention, visit: [email or web address] OR  [email or web address] OR  https://www.cdc.gov/steadi/patient.html

## 2022-01-05 NOTE — DISCHARGE NOTE NURSING/CASE MANAGEMENT/SOCIAL WORK - NSDCFUADDAPPT_GEN_ALL_CORE_FT
We made you a hospital follow-up appointment with Dr. Jones on 1/7/2022 at 3:00pm, TELE-Health appointment, please call the office prior to apt with cell pone and email address.

## 2022-01-05 NOTE — DISCHARGE NOTE PROVIDER - NSDCPNSUBOBJ_GEN_ALL_CORE
CC: Patient is a 61y old  Female who presents with a chief complaint of shortness of breath (05 Jan 2022 06:44)      Interval History:  Patient seen and examined at bedside.  No overnight events  No complaints this morning    4 HPI elements (location, quality, severity, duration, timing, context, modifying factors, associated signs and symptoms)  OR Status of 3 chronic/inactive problems    3 HPI elements (location, quality, severity, duration, timing, context, modifying factors, associated signs and symptoms)  OR Status of 3 chronic/inactive problems      ALLERGIES:  adhesives (Unknown)  Levaquin (Unknown)    MEDICATIONS  (STANDING):  ascorbic acid 1000 milliGRAM(s) Oral daily  aspirin enteric coated 81 milliGRAM(s) Oral daily  cholecalciferol 2000 Unit(s) Oral daily  dexAMETHasone  Injectable 6 milliGRAM(s) IV Push daily  enoxaparin Injectable 40 milliGRAM(s) SubCutaneous every 12 hours  exemestane 25 milliGRAM(s) Oral daily  FLUoxetine 60 milliGRAM(s) Oral daily  hydrocodone/homatropine Syrup 10 milliLiter(s) Oral at bedtime  multivitamin 1 Tablet(s) Oral daily  remdesivir  IVPB 100 milliGRAM(s) IV Intermittent every 24 hours  remdesivir  IVPB   IV Intermittent     MEDICATIONS  (PRN):  acetaminophen     Tablet .. 650 milliGRAM(s) Oral every 4 hours PRN Temp greater or equal to 38.5C (101.3F)  ALBUTerol    90 MICROgram(s) HFA Inhaler 2 Puff(s) Inhalation every 4 hours PRN Shortness of Breath and/or Wheezing  benzonatate 100 milliGRAM(s) Oral three times a day PRN Cough    Vital Signs Last 24 Hrs  T(F): 98 (05 Jan 2022 08:22), Max: 98 (05 Jan 2022 08:22)  HR: 71 (05 Jan 2022 08:22) (60 - 79)  BP: 130/67 (05 Jan 2022 08:22) (130/67 - 149/83)  RR: 16 (05 Jan 2022 08:22) (16 - 19)  SpO2: 94% (05 Jan 2022 08:22) (89% - 98%)  I&O's Summary    BMI (kg/m2): 46.9 (01-01-22 @ 12:11)    PHYSICAL EXAM:  GENERAL: NAD  HENT:  Atraumatic, Normocephalic; No tonsillar erythema, exudates, or enlargement; Moist mucous membranes  EYES: EOMI, PERRLA, conjunctiva and sclera clear, no lid-lag; moist conjunctivae  NECK: Supple, No JVD, Normal thyroid, FROM of neck  NERVOUS SYSTEM:  CN II - XII intact; Sensation intact; follows commands  CHEST/LUNG: Clear to percussion bilaterally; No rales, rhonchi, wheezing, or rubs; normal respiratory effort, no intercostal retractions  HEART: Regular rate and rhythm; No murmurs, rubs, or gallops; No pitting edema  ABDOMEN: Soft, Nontender, Nondistended; Bowel sounds present; No HSM or masses  MUSCULOSKELETAL/EXTREMITIES:  2+ Peripheral Pulses, No clubbing or cyanosis; FROM of extremeties (pain, crepitation or contracture)  PSYCH: Appropriate affect, Alert & Oriented x 3, Good Memory; Good insight    LABS:                        12.4   12.48 )-----------( 464      ( 05 Jan 2022 07:10 )             36.4       01-05    135  |  102  |  18  ----------------------------<  142  3.9   |  25  |  0.82    Ca    9.4      05 Jan 2022 07:10  Phos  2.9     01-05  Mg     2.0     01-05    TPro  6.4  /  Alb  2.8  /  TBili  0.5  /  DBili  x   /  AST  54  /  ALT  149  /  AlkPhos  60  01-05     eGFR if Non African American: 77 mL/min/1.73M2 (01-05-22 @ 07:10)  eGFR if : 89 mL/min/1.73M2 (01-05-22 @ 07:10)    PT/INR - ( 04 Jan 2022 05:30 )   PT: 17.6 sec;   INR: 1.48 ratio       COVID-19 PCR: Detected (01-01-22 @ 13:10)      Care Discussed with Consultants/Other Providers: Yes    ASSESSMENT/PLAN:    62 YO women PMH h/o breast cancer, depression presents for shortness of breath found to have COVID 19 pneumonia with acute hypoxic respiratory failure.    COVID 19 pneumonia with acute hypoxic respiratory failure  - Resolved  - Maintain on airborne isolation.  - Of O2. Range 93-99% on room air  - Obtain daily room air O2 saturations  - Continue decadron D2 (Day 4)  - Continue remdesivir D2 (Day 5)  - Acetaminophen 650 mg PO PRN fever. Limit use of NSAIDs.  - HFA albuterol PRN via MDI  - hycodan qhs  - Patient was ambulated again today without O2. O2 dropped to 92% on RA. She was not tachypneic and did not feel weak.   - D/C with PO decadron and outpatient PCP f/u    Depression  - cont fluoxetine    Hypoalbuminemia   - Nutrition eval since having poor oral intake    Transaminitis  - Patient on remdesivir which is completed  - Outpatient F/U for repeat labs    Mild leukocytosis  - likely due to steroid    Thrombocytosis  - likely reactive    DVT PPx  - lovenox BID since BMI > 30    Updated  Derian [phone] 1/5    Time spent on discharge - 33 mins

## 2022-01-05 NOTE — DISCHARGE NOTE PROVIDER - NSDCMRMEDTOKEN_GEN_ALL_CORE_FT
albuterol 90 mcg/inh inhalation aerosol: 2 puff(s) inhaled every 4 hours, As needed, Shortness of Breath and/or Wheezing  Aspir 81 oral delayed release tablet: 1 tab(s) orally once a day  dexamethasone 6 mg oral tablet: 1 tab(s) orally once a day x 6 days  exemestane 25 mg oral tablet: 1 tab(s) orally once a day  FLUoxetine: 60 milligram(s) orally once a day  hydrocodone-homatropine 5 mg-1.5 mg/5 mL oral syrup: 10 milliliter(s) orally once a day (at bedtime), As Needed -for cough MDD:10 ml  Multiple Vitamins oral tablet: 1 tab(s) orally once a day  Vitamin C 1000 mg oral tablet: 1 tab(s) orally once a day   albuterol 90 mcg/inh inhalation aerosol: 2 puff(s) inhaled every 4 hours, As needed, Shortness of Breath and/or Wheezing  Aspir 81 oral delayed release tablet: 1 tab(s) orally once a day  dexamethasone 6 mg oral tablet: 1 tab(s) orally once a day x 6 days  exemestane 25 mg oral tablet: 1 tab(s) orally once a day  FLUoxetine: 60 milligram(s) orally once a day  Multiple Vitamins oral tablet: 1 tab(s) orally once a day  Promethazine with Codeine 6.25 mg-10 mg/5 mL oral syrup: 10 milliliter(s) orally once a day (at bedtime), As Needed -for cough MDD:10 ml  Vitamin C 1000 mg oral tablet: 1 tab(s) orally once a day

## 2022-01-05 NOTE — DISCHARGE NOTE PROVIDER - NSDCFUSCHEDAPPT_GEN_ALL_CORE_FT
KRISTY OSMAN ; 02/04/2022 ; [facility] [address]  KRISTY OSMAN ; 02/07/2022 ; [facility]  Abdullahi KRISTY OSMAN ; 01/07/2022 ; [facility] [address] KRISTY Chase ; 02/04/2022 ; [facility] [address] KRISTY Chase ; 02/07/2022 ; [facility]

## 2022-01-05 NOTE — DISCHARGE NOTE NURSING/CASE MANAGEMENT/SOCIAL WORK - PATIENT PORTAL LINK FT
You can access the FollowMyHealth Patient Portal offered by [facility] [place] by registering at the following website: [email or web address]. By joining [employer]’s FollowMyHealth portal, you will also be able to view your health information using other applications (apps) compatible with our system.

## 2022-01-05 NOTE — DISCHARGE NOTE PROVIDER - NSDCFUADDINST_GEN_ALL_CORE_FT
Please have PCP repeat your lab work for your liver profile as you were on Remdesevir.    Continue taking oral steroids (decadron). Start taking next dose on Jan 6, 2022 since you received the IV dose today

## 2022-01-05 NOTE — DISCHARGE NOTE PROVIDER - HOSPITAL COURSE
Hospital Course  HPI:  61W PMH h/o breast cancer, depression presents for shortness of breath.  Patient was initially diagnosed with COVID 19 pneumonia 12/22/21 and  is currently admitted at [facility] for covid 19 pneumonia.  Patient has been having worsening SOB and so she decided to come in today.     Not vaccinated.     (01 Jan 2022 15:27)      You were admitted for   You were diagnosed with   You were treated with   You were prescribed the following new medications:    You will need to follow up with your primary care physician.    Source of Infection:  Antibiotic / Last Day:    Palliative Care / Advanced Care Planning  Code Status:  Patient/Family agreeable to Hospice/Palliative (Y/N)?  Summary of Goals of Care Conversation:    Discharging Provider:  Suni Peterson MD  Contact Info: Cell [phone] - Please call with any questions or concerns.    Outpatient Provider:    Signout given to  SNF Provider:       Hospital Course  HPI:  61W PMH h/o breast cancer, depression presents for shortness of breath.  Patient was initially diagnosed with COVID 19 pneumonia 12/22/21 and  is currently admitted at [facility] for covid 19 pneumonia.  Patient has been having worsening SOB and so she decided to come in today.     Not vaccinated. (01 Jan 2022 15:27)          You were admitted for shortness of breath  You were diagnosed with acute hypoxia due to COVID-19 infection  You were treated with decadron and remdesivir    You were prescribed the following new medications: decadron 6mg by mouth for 5 days starting Jan 6, 2022    You will need to follow up with your primary care physician.    Discharging Provider:  Suni Peterson MD  Contact Info: Cell [phone] - Please call with any questions or concerns.    Outpatient Provider: Dr. Jones - notified

## 2022-01-05 NOTE — DISCHARGE NOTE NURSING/CASE MANAGEMENT/SOCIAL WORK - NURSING SECTION COMPLETE
Status post ASA/TNK at [facility] 11/12  Cardiology consulted, coronary angiography/PCI planned for today  Continue heparin drip, antiplatelets, statin  Beta-blocker when appropriate   Patient/Caregiver provided printed discharge information.

## 2022-01-06 DIAGNOSIS — R06.02 SHORTNESS OF BREATH: ICD-10-CM

## 2022-01-06 RX ORDER — ALBUTEROL SULFATE 90 UG/1
108 (90 BASE) INHALANT RESPIRATORY (INHALATION)
Qty: 1 | Refills: 3 | Status: ACTIVE | COMMUNITY
Start: 2022-01-06

## 2022-01-06 RX ORDER — FLUOXETINE HYDROCHLORIDE 20 MG/1
20 CAPSULE ORAL
Refills: 0 | Status: COMPLETED | COMMUNITY
End: 2022-01-06

## 2022-01-06 RX ORDER — MELOXICAM 15 MG/1
15 TABLET ORAL DAILY
Qty: 30 | Refills: 0 | Status: COMPLETED | COMMUNITY
Start: 2019-08-05 | End: 2022-01-06

## 2022-01-07 ENCOUNTER — APPOINTMENT (OUTPATIENT)
Dept: FAMILY MEDICINE | Facility: CLINIC | Age: 62
End: 2022-01-07
Payer: COMMERCIAL

## 2022-01-07 DIAGNOSIS — U07.1 COVID-19: ICD-10-CM

## 2022-01-07 PROCEDURE — 99496 TRANSJ CARE MGMT HIGH F2F 7D: CPT | Mod: 95

## 2022-01-07 NOTE — HISTORY OF PRESENT ILLNESS
[Home] : at home, [unfilled] , at the time of the visit. [Medical Office: ([facility])___] : at the medical office located in  [Verbal consent obtained from patient] : the patient, [unfilled] [Post-hospitalization from ___ Hospital] : Post-hospitalization from [unfilled] Hospital [Admitted on: ___] : The patient was admitted on [unfilled] [Discharged on ___] : discharged on [unfilled] [Discharge Summary] : discharge summary [Pertinent Labs] : pertinent labs [Radiology Findings] : radiology findings [Discharge Med List] : discharge medication list [Med Reconciliation] : medication reconciliation has been completed [Patient Contacted By: ____] : and contacted by [unfilled] [FreeTextEntry2] : Had tested positive for COVID-19, was feeling reasonably stable, but had sudden onset of SOB which prompted her to present to the ER; diagnosed with COVID pneumonia; treated with Remdesivir and IV steroids, transitioned to oral for discharge.  Did not require oxygen at discharge.  Pt is finishing the steroids and is using an inhaler as needed; states feeling much improved. \par Of note pt is unvaccinated but states has an appointment for the vaccine on 1/18.

## 2022-01-07 NOTE — PHYSICAL EXAM
[No Acute Distress] : no acute distress [Normal] : normal sclera/conjunctiva, pupils are equal, round and reactive to light and extraocular movements are intact [No Respiratory Distress] : no respiratory distress  [No Focal Deficits] : no focal deficits [Speech Grossly Normal] : speech grossly normal [Memory Grossly Normal] : memory grossly normal [Normal Affect] : the affect was normal [Alert and Oriented x3] : oriented to person, place, and time [Normal Mood] : the mood was normal [Normal Insight/Judgement] : insight and judgment were intact [de-identified] : appears alert, conversant per video [de-identified] : per video [de-identified] : FROM per video [de-identified] : no evidence of dyspnea [de-identified] : per audiovisual

## 2022-02-04 ENCOUNTER — APPOINTMENT (OUTPATIENT)
Dept: FAMILY MEDICINE | Facility: CLINIC | Age: 62
End: 2022-02-04
Payer: COMMERCIAL

## 2022-02-04 VITALS
RESPIRATION RATE: 20 BRPM | BODY MASS INDEX: 45.29 KG/M2 | HEART RATE: 76 BPM | SYSTOLIC BLOOD PRESSURE: 125 MMHG | HEIGHT: 61.5 IN | WEIGHT: 243 LBS | DIASTOLIC BLOOD PRESSURE: 72 MMHG

## 2022-02-04 DIAGNOSIS — E78.5 HYPERLIPIDEMIA, UNSPECIFIED: ICD-10-CM

## 2022-02-04 PROCEDURE — 99214 OFFICE O/P EST MOD 30 MIN: CPT | Mod: 25

## 2022-02-04 PROCEDURE — 36415 COLL VENOUS BLD VENIPUNCTURE: CPT

## 2022-02-04 NOTE — HISTORY OF PRESENT ILLNESS
[de-identified] : Presents for BP check, labs, and general follow-up.  Still feels a little "tired" after COVID but in general feeling well; actively trying to watch diet - note very desirable wt loss.

## 2022-02-05 ENCOUNTER — OUTPATIENT (OUTPATIENT)
Dept: OUTPATIENT SERVICES | Facility: HOSPITAL | Age: 62
LOS: 1 days | Discharge: HOME | End: 2022-02-05

## 2022-02-05 DIAGNOSIS — Z98.890 OTHER SPECIFIED POSTPROCEDURAL STATES: Chronic | ICD-10-CM

## 2022-02-05 DIAGNOSIS — M85.80 OTHER SPECIFIED DISORDERS OF BONE DENSITY AND STRUCTURE, UNSPECIFIED SITE: ICD-10-CM

## 2022-02-05 LAB
25(OH)D3 SERPL-MCNC: 36.1 NG/ML
ALBUMIN SERPL ELPH-MCNC: 4.5 G/DL
ALP BLD-CCNC: 89 U/L
ALT SERPL-CCNC: 40 U/L
ANION GAP SERPL CALC-SCNC: 14 MMOL/L
AST SERPL-CCNC: 30 U/L
BASOPHILS # BLD AUTO: 0.04 K/UL
BASOPHILS NFR BLD AUTO: 0.4 %
BILIRUB SERPL-MCNC: 0.6 MG/DL
BUN SERPL-MCNC: 14 MG/DL
CALCIUM SERPL-MCNC: 11 MG/DL
CHLORIDE SERPL-SCNC: 104 MMOL/L
CHOLEST SERPL-MCNC: 220 MG/DL
CO2 SERPL-SCNC: 21 MMOL/L
CREAT SERPL-MCNC: 0.67 MG/DL
EOSINOPHIL # BLD AUTO: 0.08 K/UL
EOSINOPHIL NFR BLD AUTO: 0.9 %
ESTIMATED AVERAGE GLUCOSE: 128 MG/DL
FOLATE SERPL-MCNC: >20 NG/ML
GLUCOSE SERPL-MCNC: 105 MG/DL
HBA1C MFR BLD HPLC: 6.1 %
HCT VFR BLD CALC: 41.5 %
HDLC SERPL-MCNC: 43 MG/DL
HGB BLD-MCNC: 13.5 G/DL
IMM GRANULOCYTES NFR BLD AUTO: 0.2 %
LDLC SERPL CALC-MCNC: 142 MG/DL
LYMPHOCYTES # BLD AUTO: 2.19 K/UL
LYMPHOCYTES NFR BLD AUTO: 24.4 %
MAN DIFF?: NORMAL
MCHC RBC-ENTMCNC: 29.2 PG
MCHC RBC-ENTMCNC: 32.5 GM/DL
MCV RBC AUTO: 89.6 FL
MONOCYTES # BLD AUTO: 0.59 K/UL
MONOCYTES NFR BLD AUTO: 6.6 %
NEUTROPHILS # BLD AUTO: 6.06 K/UL
NEUTROPHILS NFR BLD AUTO: 67.5 %
NONHDLC SERPL-MCNC: 177 MG/DL
PLATELET # BLD AUTO: 430 K/UL
POTASSIUM SERPL-SCNC: 4.3 MMOL/L
PROT SERPL-MCNC: 6.8 G/DL
RBC # BLD: 4.63 M/UL
RBC # FLD: 14.5 %
SODIUM SERPL-SCNC: 139 MMOL/L
T4 FREE SERPL-MCNC: 1.1 NG/DL
TRIGL SERPL-MCNC: 176 MG/DL
TSH SERPL-ACNC: 3.57 UIU/ML
VIT B12 SERPL-MCNC: 590 PG/ML
WBC # FLD AUTO: 8.98 K/UL

## 2022-02-07 ENCOUNTER — APPOINTMENT (OUTPATIENT)
Dept: HEMATOLOGY ONCOLOGY | Facility: CLINIC | Age: 62
End: 2022-02-07
Payer: COMMERCIAL

## 2022-02-07 VITALS
OXYGEN SATURATION: 98 % | RESPIRATION RATE: 17 BRPM | DIASTOLIC BLOOD PRESSURE: 97 MMHG | SYSTOLIC BLOOD PRESSURE: 164 MMHG | HEIGHT: 61.5 IN | WEIGHT: 244.71 LBS | TEMPERATURE: 98 F | HEART RATE: 88 BPM | BODY MASS INDEX: 45.61 KG/M2

## 2022-02-07 PROCEDURE — 99214 OFFICE O/P EST MOD 30 MIN: CPT

## 2022-02-07 NOTE — ASSESSMENT
[FreeTextEntry1] : Lab work reviewed.\par #1) breast cancer-clinically SOPHIE. Patient consents to continue  Aromasin. To update breast imaging as planned-gets scripts from surgeon.\par \par #2) h/o osteopenia-s/p 5 years of Prolia-most recent BDT WNL. Weight bearing exercise, as well as interval f/u BDT (patient plans with GYN MD).\par \par #3) recurrent intermittent hypercalcemia-advised patient see endocrinologist in consultation. She will hold her calcium supplement pending this.\par \par #4) f/u with PCP for BP/primary care issues.\par \par Patient was given the opportunity to ask questions. Her questions have been answered to her apparent satisfaction at this time.

## 2022-02-07 NOTE — PHYSICAL EXAM
[Fully active, able to carry on all pre-disease performance without restriction] : Status 0 - Fully active, able to carry on all pre-disease performance without restriction [Normal] : affect appropriate [de-identified] : no new dominant mass or nipple discharge

## 2022-02-07 NOTE — HISTORY OF PRESENT ILLNESS
[Disease: _____________________] : Disease: [unfilled] [T: ___] : T[unfilled] [N: ___] : N[unfilled] [M: ___] : M[unfilled] [AJCC Stage: ____] : AJCC Stage: [unfilled] [de-identified] : 5/2016-Right breast cancer -Stage IA ([id number]), ER positive/VA positive/HER-2/keke negative. Status post right breast conservation surgery--> RT-->Arimidex begun 10/2016, and discontinued 5/2018 secondary to arthralgias. Began Aromasin 6/2018.\par Osteopenia-received biannual Prolia injections on aromatase inhibitor therapy-last dose 7/2021.  [de-identified] : Invasive ductal carcinoma [de-identified] : Oncotype recurrence score of 12, placing patient in low-risk category.\par  [de-identified] : S/P hospitalization for COVID infection/hypoxia.\par No current fevers. No c/o chest pain, shortness of breath, change in bowel or bladder habits. No abdominal pain. No c/o bleeding. No c/o bone pain. \par No dental/gingival pain-has dental cleaning Q 6 months.\par Sees Dr. Peña Q 6 months. Has mammogram/breast US in 4/2022, and breast MRI planned in 8/2022.\par Sees Dr. Bhakta yearly-and will do further BDT under his guidance-has appt. later this month.\par Restarted calcium supplement. Has not seen endocrine MD yet.\par Got Pfizer vaccine-#2 scheduled tomorrow.\par \par \par

## 2022-02-08 LAB — CANCER AG27-29 SERPL-ACNC: 38.4 U/ML

## 2022-03-22 ENCOUNTER — NON-APPOINTMENT (OUTPATIENT)
Age: 62
End: 2022-03-22

## 2022-04-11 PROBLEM — Z11.59 SCREENING FOR VIRAL DISEASE: Status: ACTIVE | Noted: 2021-11-12

## 2022-05-08 ENCOUNTER — NON-APPOINTMENT (OUTPATIENT)
Age: 62
End: 2022-05-08

## 2022-05-17 ENCOUNTER — RX RENEWAL (OUTPATIENT)
Age: 62
End: 2022-05-17

## 2022-05-26 ENCOUNTER — APPOINTMENT (OUTPATIENT)
Dept: FAMILY MEDICINE | Facility: CLINIC | Age: 62
End: 2022-05-26
Payer: COMMERCIAL

## 2022-05-26 VITALS
BODY MASS INDEX: 44.93 KG/M2 | HEIGHT: 61 IN | WEIGHT: 238 LBS | RESPIRATION RATE: 20 BRPM | SYSTOLIC BLOOD PRESSURE: 122 MMHG | DIASTOLIC BLOOD PRESSURE: 78 MMHG | HEART RATE: 76 BPM

## 2022-05-26 DIAGNOSIS — R73.09 OTHER ABNORMAL GLUCOSE: ICD-10-CM

## 2022-05-26 DIAGNOSIS — E83.52 HYPERCALCEMIA: ICD-10-CM

## 2022-05-26 PROCEDURE — 36415 COLL VENOUS BLD VENIPUNCTURE: CPT

## 2022-05-26 PROCEDURE — 99214 OFFICE O/P EST MOD 30 MIN: CPT | Mod: 25

## 2022-05-26 RX ORDER — FLUTICASONE PROPIONATE 50 UG/1
50 SPRAY, METERED NASAL
Qty: 1 | Refills: 3 | Status: ACTIVE | COMMUNITY
Start: 2022-05-26 | End: 1900-01-01

## 2022-05-26 NOTE — HISTORY OF PRESENT ILLNESS
[de-identified] : Presents for BP check, labs, and general follow-up.  States "really" watching diet with attention to carbs with goal of wt loss - note very desirable wt loss.\par Note has had increased allergy symptoms and requests "Flonase"

## 2022-05-26 NOTE — ASSESSMENT
[FreeTextEntry1] : Hemodynamically stable with acceptable BP\par No clinical evidence of thyroid dysfunction\par Lab profiles drawn in office and sent

## 2022-05-27 ENCOUNTER — NON-APPOINTMENT (OUTPATIENT)
Age: 62
End: 2022-05-27

## 2022-05-27 LAB
25(OH)D3 SERPL-MCNC: 40.1 NG/ML
ALBUMIN SERPL ELPH-MCNC: 4.7 G/DL
ALP BLD-CCNC: 192 U/L
ALT SERPL-CCNC: 36 U/L
ANION GAP SERPL CALC-SCNC: 15 MMOL/L
AST SERPL-CCNC: 34 U/L
BASOPHILS # BLD AUTO: 0.04 K/UL
BASOPHILS NFR BLD AUTO: 0.5 %
BILIRUB SERPL-MCNC: 0.5 MG/DL
BUN SERPL-MCNC: 13 MG/DL
CALCIUM SERPL-MCNC: 10.9 MG/DL
CHLORIDE SERPL-SCNC: 103 MMOL/L
CHOLEST SERPL-MCNC: 204 MG/DL
CO2 SERPL-SCNC: 21 MMOL/L
CREAT SERPL-MCNC: 0.65 MG/DL
EGFR: 99 ML/MIN/1.73M2
EOSINOPHIL # BLD AUTO: 0.12 K/UL
EOSINOPHIL NFR BLD AUTO: 1.4 %
ESTIMATED AVERAGE GLUCOSE: 123 MG/DL
GLUCOSE SERPL-MCNC: 105 MG/DL
HBA1C MFR BLD HPLC: 5.9 %
HCT VFR BLD CALC: 43.6 %
HDLC SERPL-MCNC: 48 MG/DL
HGB BLD-MCNC: 14.1 G/DL
IMM GRANULOCYTES NFR BLD AUTO: 0.2 %
LDLC SERPL CALC-MCNC: 136 MG/DL
LYMPHOCYTES # BLD AUTO: 2.14 K/UL
LYMPHOCYTES NFR BLD AUTO: 25.5 %
MAN DIFF?: NORMAL
MCHC RBC-ENTMCNC: 28.6 PG
MCHC RBC-ENTMCNC: 32.3 GM/DL
MCV RBC AUTO: 88.4 FL
MONOCYTES # BLD AUTO: 0.47 K/UL
MONOCYTES NFR BLD AUTO: 5.6 %
NEUTROPHILS # BLD AUTO: 5.61 K/UL
NEUTROPHILS NFR BLD AUTO: 66.8 %
NONHDLC SERPL-MCNC: 157 MG/DL
PLATELET # BLD AUTO: 376 K/UL
POTASSIUM SERPL-SCNC: 4.4 MMOL/L
PROT SERPL-MCNC: 6.9 G/DL
RBC # BLD: 4.93 M/UL
RBC # FLD: 13.2 %
SODIUM SERPL-SCNC: 139 MMOL/L
T4 FREE SERPL-MCNC: 1.1 NG/DL
TRIGL SERPL-MCNC: 104 MG/DL
TSH SERPL-ACNC: 3.36 UIU/ML
WBC # FLD AUTO: 8.4 K/UL

## 2022-06-06 ENCOUNTER — APPOINTMENT (OUTPATIENT)
Dept: NUCLEAR MEDICINE | Facility: CLINIC | Age: 62
End: 2022-06-06
Payer: COMMERCIAL

## 2022-06-06 ENCOUNTER — OUTPATIENT (OUTPATIENT)
Dept: OUTPATIENT SERVICES | Facility: HOSPITAL | Age: 62
LOS: 1 days | End: 2022-06-06
Payer: COMMERCIAL

## 2022-06-06 DIAGNOSIS — C50.911 MALIGNANT NEOPLASM OF UNSPECIFIED SITE OF RIGHT FEMALE BREAST: ICD-10-CM

## 2022-06-06 DIAGNOSIS — Z98.890 OTHER SPECIFIED POSTPROCEDURAL STATES: Chronic | ICD-10-CM

## 2022-06-06 PROCEDURE — 78815 PET IMAGE W/CT SKULL-THIGH: CPT

## 2022-06-06 PROCEDURE — 78815 PET IMAGE W/CT SKULL-THIGH: CPT | Mod: 26,PI

## 2022-06-06 PROCEDURE — A9552: CPT

## 2022-06-08 ENCOUNTER — NON-APPOINTMENT (OUTPATIENT)
Age: 62
End: 2022-06-08

## 2022-07-28 ENCOUNTER — OUTPATIENT (OUTPATIENT)
Dept: OUTPATIENT SERVICES | Facility: HOSPITAL | Age: 62
LOS: 1 days | Discharge: HOME | End: 2022-07-28

## 2022-07-28 DIAGNOSIS — Z98.890 OTHER SPECIFIED POSTPROCEDURAL STATES: Chronic | ICD-10-CM

## 2022-07-28 DIAGNOSIS — M85.80 OTHER SPECIFIED DISORDERS OF BONE DENSITY AND STRUCTURE, UNSPECIFIED SITE: ICD-10-CM

## 2022-08-03 LAB
ALBUMIN SERPL ELPH-MCNC: 4.5 G/DL
ALP BLD-CCNC: 168 U/L
ALT SERPL-CCNC: 32 U/L
ANION GAP SERPL CALC-SCNC: 12 MMOL/L
AST SERPL-CCNC: 32 U/L
BASOPHILS # BLD AUTO: 0.03 K/UL
BASOPHILS NFR BLD AUTO: 0.4 %
BILIRUB SERPL-MCNC: 0.6 MG/DL
BUN SERPL-MCNC: 12 MG/DL
CALCIUM SERPL-MCNC: 10.8 MG/DL
CHLORIDE SERPL-SCNC: 105 MMOL/L
CO2 SERPL-SCNC: 23 MMOL/L
CREAT SERPL-MCNC: 0.67 MG/DL
EGFR: 98 ML/MIN/1.73M2
EOSINOPHIL # BLD AUTO: 0.1 K/UL
EOSINOPHIL NFR BLD AUTO: 1.3 %
GLUCOSE SERPL-MCNC: 115 MG/DL
HCT VFR BLD CALC: 42.4 %
HGB BLD-MCNC: 14.1 G/DL
IMM GRANULOCYTES NFR BLD AUTO: 0.4 %
LYMPHOCYTES # BLD AUTO: 1.75 K/UL
LYMPHOCYTES NFR BLD AUTO: 23.6 %
MAN DIFF?: NORMAL
MCHC RBC-ENTMCNC: 28.7 PG
MCHC RBC-ENTMCNC: 33.3 GM/DL
MCV RBC AUTO: 86.4 FL
MONOCYTES # BLD AUTO: 0.46 K/UL
MONOCYTES NFR BLD AUTO: 6.2 %
NEUTROPHILS # BLD AUTO: 5.05 K/UL
NEUTROPHILS NFR BLD AUTO: 68.1 %
PLATELET # BLD AUTO: 339 K/UL
POTASSIUM SERPL-SCNC: 4.4 MMOL/L
PROT SERPL-MCNC: 7 G/DL
RBC # BLD: 4.91 M/UL
RBC # FLD: 13.4 %
SODIUM SERPL-SCNC: 140 MMOL/L
WBC # FLD AUTO: 7.42 K/UL

## 2022-08-08 ENCOUNTER — APPOINTMENT (OUTPATIENT)
Dept: HEMATOLOGY ONCOLOGY | Facility: CLINIC | Age: 62
End: 2022-08-08

## 2022-08-08 VITALS
WEIGHT: 234.79 LBS | RESPIRATION RATE: 16 BRPM | DIASTOLIC BLOOD PRESSURE: 88 MMHG | HEIGHT: 60.98 IN | SYSTOLIC BLOOD PRESSURE: 157 MMHG | TEMPERATURE: 97.7 F | BODY MASS INDEX: 44.33 KG/M2 | OXYGEN SATURATION: 98 % | HEART RATE: 79 BPM

## 2022-08-08 PROCEDURE — 99214 OFFICE O/P EST MOD 30 MIN: CPT

## 2022-08-08 NOTE — ASSESSMENT
[FreeTextEntry1] : Lab work, mammogram, breast US, breast MRI, PET/CT scan results reviewed.\par #1) breast cancer-clinically SOPHIE. Patient consents to continue  Aromasin.\par \par #2) h/o osteopenia-s/p 5 years of Prolia-most recent BDT WNL. Weight bearing exercise, as well as interval f/u BDT (patient plans with GYN MD)-stated done this year.\par \par #3) recurrent intermittent hypercalcemia. Reports unremarkable endocrine evaluation. Patient will continue to hold her calcium supplement. Continued diuretic per PCP.\par 6/2022 PET/CT scan-nonspecific FDG avid mild skin thickening in the right breast. Nonspecific small focal FDG avidity within the uterine cavity without CT correlate. Had f/u unremarkable GYN evaluation. Note urine and serum immunofixation negative for monoclonal protein 7/2021, and WBBS was negative for evidence of osseous metastases.\par If progressive, consistent rise in calcium, need t/c re-evaluation.\par \par #4) f/u with PCP for BP/primary care issues.\par \par Patient was given the opportunity to ask questions. Her questions have been answered to her apparent satisfaction at this time.

## 2022-08-08 NOTE — HISTORY OF PRESENT ILLNESS
[Disease: _____________________] : Disease: [unfilled] [T: ___] : T[unfilled] [N: ___] : N[unfilled] [M: ___] : M[unfilled] [AJCC Stage: ____] : AJCC Stage: [unfilled] [de-identified] : 5/2016-Right breast cancer -Stage IA ([id number]), ER positive/RI positive/HER-2/keke negative. Status post right breast conservation surgery--> RT-->Arimidex begun 10/2016, and discontinued 5/2018 secondary to arthralgias. Began Aromasin 6/2018.\par \par Osteopenia-received biannual Prolia injections on aromatase inhibitor therapy-last dose 7/2021.  [de-identified] : Invasive ductal carcinoma [de-identified] : Oncotype recurrence score of 12, placing patient in low-risk category.\par  [de-identified] : No current fevers. No c/o chest pain, shortness of breath, change in bowel or bladder habits. No abdominal pain. No c/o bleeding. No c/o bone pain. \par No dental/gingival pain-has dental cleaning Q 6 months.\par Sees Dr. Peña Q 6 months.\par Sees Dr. Bhakta yearly-and will do further BDT under his guidance.\par States diuretic has decreased calcium level, per endocrine MD (Dr. Aguilera).\par Stopped calcium and vitamin D supplements.supplement. \par Saw dermatologist for hair loss-told due to COVID-states hair is coming back.\par \par Has had Pfizer COVID vaccines.\par \par \par

## 2022-08-08 NOTE — PHYSICAL EXAM
[Fully active, able to carry on all pre-disease performance without restriction] : Status 0 - Fully active, able to carry on all pre-disease performance without restriction [Normal] : affect appropriate [de-identified] : no new dominant mass or nipple discharge

## 2022-08-08 NOTE — RESULTS/DATA
[FreeTextEntry1] : 8/4/2022-Breast MRI-no MRI evidence of malignancy in either breast.\par 4/2022-Mammogram/breast US-no mammographic or sonographic evidence of malignancy.

## 2022-08-11 ENCOUNTER — RX RENEWAL (OUTPATIENT)
Age: 62
End: 2022-08-11

## 2022-08-29 ENCOUNTER — RX RENEWAL (OUTPATIENT)
Age: 62
End: 2022-08-29

## 2022-09-29 ENCOUNTER — RX RENEWAL (OUTPATIENT)
Age: 62
End: 2022-09-29

## 2022-10-10 DIAGNOSIS — J06.9 ACUTE UPPER RESPIRATORY INFECTION, UNSPECIFIED: ICD-10-CM

## 2022-10-25 ENCOUNTER — NON-APPOINTMENT (OUTPATIENT)
Age: 62
End: 2022-10-25

## 2022-11-10 ENCOUNTER — APPOINTMENT (OUTPATIENT)
Dept: FAMILY MEDICINE | Facility: CLINIC | Age: 62
End: 2022-11-10

## 2022-11-10 VITALS
RESPIRATION RATE: 20 BRPM | HEART RATE: 76 BPM | SYSTOLIC BLOOD PRESSURE: 125 MMHG | HEIGHT: 61 IN | BODY MASS INDEX: 44.37 KG/M2 | DIASTOLIC BLOOD PRESSURE: 76 MMHG | WEIGHT: 235 LBS

## 2022-11-10 DIAGNOSIS — E03.9 HYPOTHYROIDISM, UNSPECIFIED: ICD-10-CM

## 2022-11-10 DIAGNOSIS — K90.49 MALABSORPTION DUE TO INTOLERANCE, NOT ELSEWHERE CLASSIFIED: ICD-10-CM

## 2022-11-10 DIAGNOSIS — R60.0 LOCALIZED EDEMA: ICD-10-CM

## 2022-11-10 DIAGNOSIS — E78.5 HYPERLIPIDEMIA, UNSPECIFIED: ICD-10-CM

## 2022-11-10 PROCEDURE — 36415 COLL VENOUS BLD VENIPUNCTURE: CPT

## 2022-11-10 PROCEDURE — 90686 IIV4 VACC NO PRSV 0.5 ML IM: CPT

## 2022-11-10 PROCEDURE — 99214 OFFICE O/P EST MOD 30 MIN: CPT | Mod: 25

## 2022-11-10 PROCEDURE — G0008: CPT

## 2022-11-10 NOTE — HISTORY OF PRESENT ILLNESS
[de-identified] : Presents for BP check, labs, and general follow-up; also requests the current flu vaccine.  States feeling generally well; trying to watch diet with goal of wt loss.

## 2022-11-11 LAB
25(OH)D3 SERPL-MCNC: 42.1 NG/ML
ALBUMIN SERPL ELPH-MCNC: 4.7 G/DL
ALP BLD-CCNC: 173 U/L
ALT SERPL-CCNC: 32 U/L
ANION GAP SERPL CALC-SCNC: 14 MMOL/L
AST SERPL-CCNC: 30 U/L
BASOPHILS # BLD AUTO: 0.05 K/UL
BASOPHILS NFR BLD AUTO: 0.5 %
BILIRUB SERPL-MCNC: 0.2 MG/DL
BUN SERPL-MCNC: 14 MG/DL
CALCIUM SERPL-MCNC: 10.8 MG/DL
CHLORIDE SERPL-SCNC: 104 MMOL/L
CHOLEST SERPL-MCNC: 202 MG/DL
CO2 SERPL-SCNC: 24 MMOL/L
COVID-19 NUCLEOCAPSID  GAM ANTIBODY INTERPRETATION: POSITIVE
COVID-19 SPIKE DOMAIN ANTIBODY INTERPRETATION: POSITIVE
CREAT SERPL-MCNC: 0.88 MG/DL
EGFR: 74 ML/MIN/1.73M2
EOSINOPHIL # BLD AUTO: 0.24 K/UL
EOSINOPHIL NFR BLD AUTO: 2.2 %
ESTIMATED AVERAGE GLUCOSE: 117 MG/DL
GLUCOSE SERPL-MCNC: 93 MG/DL
HBA1C MFR BLD HPLC: 5.7 %
HCT VFR BLD CALC: 41.4 %
HDLC SERPL-MCNC: 52 MG/DL
HGB BLD-MCNC: 14.1 G/DL
IMM GRANULOCYTES NFR BLD AUTO: 0.2 %
LDLC SERPL CALC-MCNC: 120 MG/DL
LYMPHOCYTES # BLD AUTO: 3.27 K/UL
LYMPHOCYTES NFR BLD AUTO: 30.4 %
MAN DIFF?: NORMAL
MCHC RBC-ENTMCNC: 29.5 PG
MCHC RBC-ENTMCNC: 34.1 GM/DL
MCV RBC AUTO: 86.6 FL
MONOCYTES # BLD AUTO: 0.65 K/UL
MONOCYTES NFR BLD AUTO: 6 %
NEUTROPHILS # BLD AUTO: 6.52 K/UL
NEUTROPHILS NFR BLD AUTO: 60.7 %
NONHDLC SERPL-MCNC: 151 MG/DL
PLATELET # BLD AUTO: 331 K/UL
POTASSIUM SERPL-SCNC: 3.9 MMOL/L
PROT SERPL-MCNC: 7.2 G/DL
RBC # BLD: 4.78 M/UL
RBC # FLD: 13.4 %
SARS-COV-2 AB SERPL IA-ACNC: >250 U/ML
SARS-COV-2 AB SERPL QL IA: 88.8 INDEX
SODIUM SERPL-SCNC: 142 MMOL/L
T4 FREE SERPL-MCNC: 1.1 NG/DL
TRIGL SERPL-MCNC: 153 MG/DL
TSH SERPL-ACNC: 5.03 UIU/ML
WBC # FLD AUTO: 10.75 K/UL

## 2022-11-29 DIAGNOSIS — R30.0 DYSURIA: ICD-10-CM

## 2022-12-01 LAB — BACTERIA UR CULT: NORMAL

## 2022-12-02 ENCOUNTER — NON-APPOINTMENT (OUTPATIENT)
Age: 62
End: 2022-12-02

## 2023-02-03 ENCOUNTER — OUTPATIENT (OUTPATIENT)
Dept: OUTPATIENT SERVICES | Facility: HOSPITAL | Age: 63
LOS: 1 days | Discharge: HOME | End: 2023-02-03

## 2023-02-03 DIAGNOSIS — Z98.890 OTHER SPECIFIED POSTPROCEDURAL STATES: Chronic | ICD-10-CM

## 2023-02-03 DIAGNOSIS — M85.80 OTHER SPECIFIED DISORDERS OF BONE DENSITY AND STRUCTURE, UNSPECIFIED SITE: ICD-10-CM

## 2023-02-03 LAB
ALBUMIN SERPL ELPH-MCNC: 4.5 G/DL
ALP BLD-CCNC: 157 U/L
ALT SERPL-CCNC: 33 U/L
ANION GAP SERPL CALC-SCNC: 11 MMOL/L
AST SERPL-CCNC: 29 U/L
BASOPHILS # BLD AUTO: 0.04 K/UL
BASOPHILS NFR BLD AUTO: 0.4 %
BILIRUB SERPL-MCNC: 0.8 MG/DL
BUN SERPL-MCNC: 10 MG/DL
CALCIUM SERPL-MCNC: 10.5 MG/DL
CANCER AG27-29 SERPL-ACNC: 30.3 U/ML
CHLORIDE SERPL-SCNC: 105 MMOL/L
CO2 SERPL-SCNC: 23 MMOL/L
CREAT SERPL-MCNC: 0.68 MG/DL
EGFR: 98 ML/MIN/1.73M2
EOSINOPHIL # BLD AUTO: 0.17 K/UL
EOSINOPHIL NFR BLD AUTO: 1.8 %
GLUCOSE SERPL-MCNC: 114 MG/DL
HCT VFR BLD CALC: 43.1 %
HGB BLD-MCNC: 14.7 G/DL
IMM GRANULOCYTES NFR BLD AUTO: 0.3 %
LYMPHOCYTES # BLD AUTO: 1.24 K/UL
LYMPHOCYTES NFR BLD AUTO: 12.8 %
MAN DIFF?: NORMAL
MCHC RBC-ENTMCNC: 29.5 PG
MCHC RBC-ENTMCNC: 34.1 GM/DL
MCV RBC AUTO: 86.5 FL
MONOCYTES # BLD AUTO: 0.62 K/UL
MONOCYTES NFR BLD AUTO: 6.4 %
NEUTROPHILS # BLD AUTO: 7.6 K/UL
NEUTROPHILS NFR BLD AUTO: 78.3 %
PLATELET # BLD AUTO: 318 K/UL
POTASSIUM SERPL-SCNC: 4.4 MMOL/L
PROT SERPL-MCNC: 6.8 G/DL
RBC # BLD: 4.98 M/UL
RBC # FLD: 13.4 %
SODIUM SERPL-SCNC: 140 MMOL/L
WBC # FLD AUTO: 9.7 K/UL

## 2023-02-08 ENCOUNTER — APPOINTMENT (OUTPATIENT)
Dept: HEMATOLOGY ONCOLOGY | Facility: CLINIC | Age: 63
End: 2023-02-08
Payer: COMMERCIAL

## 2023-02-08 VITALS
SYSTOLIC BLOOD PRESSURE: 153 MMHG | BODY MASS INDEX: 43.57 KG/M2 | WEIGHT: 230.6 LBS | DIASTOLIC BLOOD PRESSURE: 74 MMHG | HEART RATE: 110 BPM | TEMPERATURE: 97.6 F | RESPIRATION RATE: 16 BRPM | OXYGEN SATURATION: 96 %

## 2023-02-08 DIAGNOSIS — Z92.89 PERSONAL HISTORY OF OTHER MEDICAL TREATMENT: ICD-10-CM

## 2023-02-08 PROCEDURE — 99214 OFFICE O/P EST MOD 30 MIN: CPT

## 2023-02-08 RX ORDER — AZITHROMYCIN 250 MG/1
250 TABLET, FILM COATED ORAL
Qty: 1 | Refills: 0 | Status: DISCONTINUED | COMMUNITY
Start: 2022-10-10 | End: 2023-02-08

## 2023-02-08 RX ORDER — DEXAMETHASONE 6 MG/1
6 TABLET ORAL DAILY
Refills: 0 | Status: DISCONTINUED | COMMUNITY
Start: 2022-01-06 | End: 2023-02-08

## 2023-02-08 NOTE — PHYSICAL EXAM
[Fully active, able to carry on all pre-disease performance without restriction] : Status 0 - Fully active, able to carry on all pre-disease performance without restriction [Normal] : affect appropriate [de-identified] : no new dominant mass or nipple discharge

## 2023-02-08 NOTE — HISTORY OF PRESENT ILLNESS
[Disease: _____________________] : Disease: [unfilled] [T: ___] : T[unfilled] [N: ___] : N[unfilled] [M: ___] : M[unfilled] [AJCC Stage: ____] : AJCC Stage: [unfilled] [de-identified] : 5/2016-Right breast cancer -Stage IA ([id number]), ER positive/MD positive/HER-2/keke negative. Status post right breast conservation surgery--> RT-->Arimidex begun 10/2016, and discontinued 5/2018 secondary to arthralgias. Began Aromasin 6/2018.\par \par Osteopenia-received biannual Prolia injections on aromatase inhibitor therapy-last dose 7/2021.  [de-identified] : Invasive ductal carcinoma [de-identified] : Oncotype recurrence score of 12, placing patient in low-risk category.\par  [de-identified] : Sees Dr. Peña Q 6 months-next appt. 2/17-gets breast imaging scripts from him.\par Sees Dr. Bhakta in near future for GYN f/u. Sees Dr. Bhakta yearly-and will do further BDT under his guidance.\par No current fevers. No c/o chest pain, shortness of breath, change in bowel or bladder habits. No abdominal pain. No c/o bleeding. No c/o bone pain. \par No dental/gingival pain-has dental cleaning Q 6 months.\par States diuretic has decreased calcium level, per endocrine MD.\par Stopped calcium and vitamin D supplements.supplement. \par \par Has had Pfizer COVID vaccines.\par \par \par

## 2023-02-08 NOTE — ASSESSMENT
[FreeTextEntry1] : Lab work reviewed.\par #1) breast cancer-clinically SOPHIE. Patient consents to continue  Aromasin.\par \par #2) h/o osteopenia-s/p 5 years of Prolia-most recent BDT 5/2022 WNL. Weight bearing exercise.\par \par #3) h/o recurrent intermittent hypercalcemia. Reported unremarkable endocrine evaluation. \par 6/2022 PET/CT scan-nonspecific FDG avid mild skin thickening in the right breast. Nonspecific small focal FDG avidity within the uterine cavity without CT correlate. Had f/u unremarkable GYN evaluation. Note urine and serum immunofixation negative for monoclonal protein 7/2021, and WBBS was negative for evidence of osseous metastases.\par Most recent calcium level WNL. Follow clinically. If progressive, consistent rise in calcium, need t/c re-evaluation.\par \par #4) Continue f/u with PCP for BP/primary care issues.\par \par Patient was given the opportunity to ask questions. Her questions have been answered to her apparent satisfaction at this time.

## 2023-05-12 ENCOUNTER — EMERGENCY (EMERGENCY)
Facility: HOSPITAL | Age: 63
LOS: 1 days | Discharge: HOME | End: 2023-05-12
Attending: EMERGENCY MEDICINE | Admitting: EMERGENCY MEDICINE
Payer: COMMERCIAL

## 2023-05-12 VITALS
HEIGHT: 61 IN | SYSTOLIC BLOOD PRESSURE: 155 MMHG | OXYGEN SATURATION: 100 % | WEIGHT: 220.02 LBS | DIASTOLIC BLOOD PRESSURE: 82 MMHG | HEART RATE: 79 BPM | TEMPERATURE: 98 F | RESPIRATION RATE: 18 BRPM

## 2023-05-12 DIAGNOSIS — Z98.890 OTHER SPECIFIED POSTPROCEDURAL STATES: Chronic | ICD-10-CM

## 2023-05-12 LAB
ALBUMIN SERPL ELPH-MCNC: 3.7 G/DL — SIGNIFICANT CHANGE UP (ref 3.3–5)
ALP SERPL-CCNC: 147 U/L — HIGH (ref 40–120)
ALT FLD-CCNC: 49 U/L — HIGH (ref 10–45)
ANION GAP SERPL CALC-SCNC: 10 MMOL/L — SIGNIFICANT CHANGE UP (ref 5–17)
APPEARANCE UR: CLEAR — SIGNIFICANT CHANGE UP
AST SERPL-CCNC: 30 U/L — SIGNIFICANT CHANGE UP (ref 10–40)
BACTERIA # UR AUTO: NEGATIVE /HPF — SIGNIFICANT CHANGE UP
BASOPHILS # BLD AUTO: 0.05 K/UL — SIGNIFICANT CHANGE UP (ref 0–0.2)
BASOPHILS NFR BLD AUTO: 0.3 % — SIGNIFICANT CHANGE UP (ref 0–2)
BILIRUB SERPL-MCNC: 0.5 MG/DL — SIGNIFICANT CHANGE UP (ref 0.2–1.2)
BILIRUB UR-MCNC: NEGATIVE — SIGNIFICANT CHANGE UP
BUN SERPL-MCNC: 28 MG/DL — HIGH (ref 7–23)
CALCIUM SERPL-MCNC: 10.5 MG/DL — SIGNIFICANT CHANGE UP (ref 8.4–10.5)
CHLORIDE SERPL-SCNC: 105 MMOL/L — SIGNIFICANT CHANGE UP (ref 96–108)
CO2 SERPL-SCNC: 25 MMOL/L — SIGNIFICANT CHANGE UP (ref 22–31)
COLOR SPEC: YELLOW — SIGNIFICANT CHANGE UP
CREAT SERPL-MCNC: 1.09 MG/DL — SIGNIFICANT CHANGE UP (ref 0.5–1.3)
DIFF PNL FLD: ABNORMAL
EGFR: 56 ML/MIN/1.73M2 — LOW
EOSINOPHIL # BLD AUTO: 0.11 K/UL — SIGNIFICANT CHANGE UP (ref 0–0.5)
EOSINOPHIL NFR BLD AUTO: 0.7 % — SIGNIFICANT CHANGE UP (ref 0–6)
EPI CELLS # UR: 0 — SIGNIFICANT CHANGE UP
GLUCOSE SERPL-MCNC: 129 MG/DL — HIGH (ref 70–99)
GLUCOSE UR QL: NEGATIVE MG/DL — SIGNIFICANT CHANGE UP
HCT VFR BLD CALC: 42.1 % — SIGNIFICANT CHANGE UP (ref 34.5–45)
HGB BLD-MCNC: 14.4 G/DL — SIGNIFICANT CHANGE UP (ref 11.5–15.5)
IMM GRANULOCYTES NFR BLD AUTO: 0.6 % — SIGNIFICANT CHANGE UP (ref 0–0.9)
KETONES UR-MCNC: NEGATIVE MG/DL — SIGNIFICANT CHANGE UP
LEUKOCYTE ESTERASE UR-ACNC: NEGATIVE — SIGNIFICANT CHANGE UP
LIDOCAIN IGE QN: 53 U/L — LOW (ref 73–393)
LYMPHOCYTES # BLD AUTO: 1.07 K/UL — SIGNIFICANT CHANGE UP (ref 1–3.3)
LYMPHOCYTES # BLD AUTO: 7.2 % — LOW (ref 13–44)
MCHC RBC-ENTMCNC: 30.3 PG — SIGNIFICANT CHANGE UP (ref 27–34)
MCHC RBC-ENTMCNC: 34.2 GM/DL — SIGNIFICANT CHANGE UP (ref 32–36)
MCV RBC AUTO: 88.6 FL — SIGNIFICANT CHANGE UP (ref 80–100)
MONOCYTES # BLD AUTO: 1 K/UL — HIGH (ref 0–0.9)
MONOCYTES NFR BLD AUTO: 6.8 % — SIGNIFICANT CHANGE UP (ref 2–14)
NEUTROPHILS # BLD AUTO: 12.48 K/UL — HIGH (ref 1.8–7.4)
NEUTROPHILS NFR BLD AUTO: 84.4 % — HIGH (ref 43–77)
NITRITE UR-MCNC: NEGATIVE — SIGNIFICANT CHANGE UP
NRBC # BLD: 0 /100 WBCS — SIGNIFICANT CHANGE UP (ref 0–0)
PH UR: 7 — SIGNIFICANT CHANGE UP (ref 5–8)
PLATELET # BLD AUTO: 313 K/UL — SIGNIFICANT CHANGE UP (ref 150–400)
POTASSIUM SERPL-MCNC: 4.3 MMOL/L — SIGNIFICANT CHANGE UP (ref 3.5–5.3)
POTASSIUM SERPL-SCNC: 4.3 MMOL/L — SIGNIFICANT CHANGE UP (ref 3.5–5.3)
PROT SERPL-MCNC: 6.9 G/DL — SIGNIFICANT CHANGE UP (ref 6–8.3)
PROT UR-MCNC: NEGATIVE MG/DL — SIGNIFICANT CHANGE UP
RBC # BLD: 4.75 M/UL — SIGNIFICANT CHANGE UP (ref 3.8–5.2)
RBC # FLD: 12.6 % — SIGNIFICANT CHANGE UP (ref 10.3–14.5)
RBC CASTS # UR COMP ASSIST: 2 /HPF — SIGNIFICANT CHANGE UP (ref 0–4)
SODIUM SERPL-SCNC: 140 MMOL/L — SIGNIFICANT CHANGE UP (ref 135–145)
SP GR SPEC: 1.01 — SIGNIFICANT CHANGE UP (ref 1–1.03)
UROBILINOGEN FLD QL: 0.2 MG/DL — SIGNIFICANT CHANGE UP (ref 0.2–1)
WBC # BLD: 14.8 K/UL — HIGH (ref 3.8–10.5)
WBC # FLD AUTO: 14.8 K/UL — HIGH (ref 3.8–10.5)
WBC UR QL: 2 /HPF — SIGNIFICANT CHANGE UP (ref 0–5)

## 2023-05-12 PROCEDURE — 74176 CT ABD & PELVIS W/O CONTRAST: CPT | Mod: 26,MA

## 2023-05-12 PROCEDURE — 96374 THER/PROPH/DIAG INJ IV PUSH: CPT

## 2023-05-12 PROCEDURE — 87086 URINE CULTURE/COLONY COUNT: CPT

## 2023-05-12 PROCEDURE — 99284 EMERGENCY DEPT VISIT MOD MDM: CPT | Mod: 25

## 2023-05-12 PROCEDURE — 83690 ASSAY OF LIPASE: CPT

## 2023-05-12 PROCEDURE — 74176 CT ABD & PELVIS W/O CONTRAST: CPT | Mod: MA

## 2023-05-12 PROCEDURE — 80053 COMPREHEN METABOLIC PANEL: CPT

## 2023-05-12 PROCEDURE — 96375 TX/PRO/DX INJ NEW DRUG ADDON: CPT

## 2023-05-12 PROCEDURE — 36415 COLL VENOUS BLD VENIPUNCTURE: CPT

## 2023-05-12 PROCEDURE — 99284 EMERGENCY DEPT VISIT MOD MDM: CPT

## 2023-05-12 PROCEDURE — 81001 URINALYSIS AUTO W/SCOPE: CPT

## 2023-05-12 PROCEDURE — 85025 COMPLETE CBC W/AUTO DIFF WBC: CPT

## 2023-05-12 RX ORDER — ONDANSETRON 8 MG/1
4 TABLET, FILM COATED ORAL ONCE
Refills: 0 | Status: COMPLETED | OUTPATIENT
Start: 2023-05-12 | End: 2023-05-12

## 2023-05-12 RX ORDER — OXYCODONE AND ACETAMINOPHEN 5; 325 MG/1; MG/1
2 TABLET ORAL
Qty: 24 | Refills: 0
Start: 2023-05-12 | End: 2023-05-14

## 2023-05-12 RX ORDER — KETOROLAC TROMETHAMINE 30 MG/ML
15 SYRINGE (ML) INJECTION ONCE
Refills: 0 | Status: DISCONTINUED | OUTPATIENT
Start: 2023-05-12 | End: 2023-05-12

## 2023-05-12 RX ORDER — SODIUM CHLORIDE 9 MG/ML
1000 INJECTION INTRAMUSCULAR; INTRAVENOUS; SUBCUTANEOUS ONCE
Refills: 0 | Status: COMPLETED | OUTPATIENT
Start: 2023-05-12 | End: 2023-05-12

## 2023-05-12 RX ORDER — TAMSULOSIN HYDROCHLORIDE 0.4 MG/1
1 CAPSULE ORAL
Qty: 5 | Refills: 0
Start: 2023-05-12 | End: 2023-05-16

## 2023-05-12 RX ADMIN — Medication 15 MILLIGRAM(S): at 04:06

## 2023-05-12 RX ADMIN — ONDANSETRON 4 MILLIGRAM(S): 8 TABLET, FILM COATED ORAL at 04:05

## 2023-05-12 RX ADMIN — SODIUM CHLORIDE 1000 MILLILITER(S): 9 INJECTION INTRAMUSCULAR; INTRAVENOUS; SUBCUTANEOUS at 04:06

## 2023-05-12 NOTE — ED ADULT NURSE NOTE - NSFALLUNIVINTERV_ED_ALL_ED
Bed/Stretcher in lowest position, wheels locked, appropriate side rails in place/Call bell, personal items and telephone in reach/Instruct patient to call for assistance before getting out of bed/chair/stretcher/Non-slip footwear applied when patient is off stretcher/[place] to call system/Physically safe environment - no spills, clutter or unnecessary equipment/Purposeful proactive rounding/Room/bathroom lighting operational, light cord in reach

## 2023-05-12 NOTE — ED PROVIDER NOTE - CLINICAL SUMMARY MEDICAL DECISION MAKING FREE TEXT BOX
pt with R renal colic pt with R renal colic, pain now resolved after toradol, pt has 7-8mm proximal kidney stone, stable for dc and fu w urology

## 2023-05-12 NOTE — ED PROVIDER NOTE - PHYSICAL EXAMINATION
Gen: alert, NAD  HEENT:  NC/AT, PERR, no exophthalmos  CV:  well perfused, rrr   Pulm:  normal RR, breathing comfortably, CTA b/l  Abd: s/nt/nd  : no CVAT  MSK: moving all extremities  Neuro:  non-focal  Skin:  visualized areas intact  Psych: AOx3

## 2023-05-12 NOTE — ED PROVIDER NOTE - CARE PROVIDER_API CALL
Micheal Cobos  UROLOGY  [address] [place] [address], [address]  [place], [place] [id number]  Phone: [phone]  Fax: [phone]  Follow Up Time:

## 2023-05-12 NOTE — ED PROVIDER NOTE - PATIENT PORTAL LINK FT
You can access the FollowMyHealth Patient Portal offered by [facility] by registering at the following website: [email or web address]. By joining [employer]’s FollowMyHealth portal, you will also be able to view your health information using other applications (apps) compatible with our system.

## 2023-05-12 NOTE — ED PROVIDER NOTE - OBJECTIVE STATEMENT
Patient with sudden onset of right flank pain radiating to the right lower quadrant about 1 hour ago.  Pain was associated with nausea and 1 attempted vomiting.  Patient reports that the pain is 7 out of 10 and constant. pt has h/o kidney stones.

## 2023-05-12 NOTE — ED ADULT NURSE NOTE - BREATHING, MLM
Spontaneous, unlabored and symmetrical Advancement-Rotation Flap Text: The defect edges were debeveled with a #15 scalpel blade.  Given the location of the defect, shape of the defect and the proximity to free margins an advancement-rotation flap was deemed most appropriate.  Using a sterile surgical marker, an appropriate flap was drawn incorporating the defect and placing the expected incisions within the relaxed skin tension lines where possible. The area thus outlined was incised deep to adipose tissue with a #15 scalpel blade.  The skin margins were undermined to an appropriate distance in all directions utilizing iris scissors.

## 2023-05-12 NOTE — ED PROVIDER NOTE - NSFOLLOWUPINSTRUCTIONS_ED_ALL_ED_FT
-- You should update your primary care physician on your Emergency Department visit and follow up with them.  If you do not have a physician or have difficulty following up, please call: [phone] to obtain a [facility] doctor or specialist who can provide follow up.    -- Oxycodone for any severe pain, otherwise stick to tylenol.    -- Take tylenol 650 mg every 4 hours, as needed for pain    -- Return to the ER for worsening or persistent symptoms, and/or ANY NEW OR CONCERNING SYMPTOMS.

## 2023-05-14 LAB
CULTURE RESULTS: SIGNIFICANT CHANGE UP
SPECIMEN SOURCE: SIGNIFICANT CHANGE UP

## 2023-05-17 ENCOUNTER — OUTPATIENT (OUTPATIENT)
Dept: OUTPATIENT SERVICES | Facility: HOSPITAL | Age: 63
LOS: 1 days | End: 2023-05-17
Payer: COMMERCIAL

## 2023-05-17 ENCOUNTER — APPOINTMENT (OUTPATIENT)
Dept: RADIOLOGY | Facility: HOSPITAL | Age: 63
End: 2023-05-17
Payer: COMMERCIAL

## 2023-05-17 DIAGNOSIS — Z00.8 ENCOUNTER FOR OTHER GENERAL EXAMINATION: ICD-10-CM

## 2023-05-17 DIAGNOSIS — Z98.890 OTHER SPECIFIED POSTPROCEDURAL STATES: Chronic | ICD-10-CM

## 2023-05-17 PROCEDURE — 74018 RADEX ABDOMEN 1 VIEW: CPT | Mod: 26

## 2023-05-17 PROCEDURE — 74018 RADEX ABDOMEN 1 VIEW: CPT

## 2023-05-22 ENCOUNTER — NON-APPOINTMENT (OUTPATIENT)
Age: 63
End: 2023-05-22

## 2023-06-23 ENCOUNTER — APPOINTMENT (OUTPATIENT)
Dept: FAMILY MEDICINE | Facility: CLINIC | Age: 63
End: 2023-06-23
Payer: COMMERCIAL

## 2023-06-23 VITALS
HEART RATE: 76 BPM | DIASTOLIC BLOOD PRESSURE: 70 MMHG | RESPIRATION RATE: 20 BRPM | HEIGHT: 61 IN | SYSTOLIC BLOOD PRESSURE: 125 MMHG | WEIGHT: 218 LBS | BODY MASS INDEX: 41.16 KG/M2

## 2023-06-23 PROCEDURE — 93000 ELECTROCARDIOGRAM COMPLETE: CPT

## 2023-06-23 PROCEDURE — 99214 OFFICE O/P EST MOD 30 MIN: CPT | Mod: 25

## 2023-06-23 NOTE — RESULTS/DATA
[] : results reviewed [de-identified] : acceptable [de-identified] : acceptable [de-identified] : normal tracing [de-identified] : urine culture negative

## 2023-06-23 NOTE — HISTORY OF PRESENT ILLNESS
[No Pertinent Cardiac History] : no history of aortic stenosis, atrial fibrillation, coronary artery disease, recent myocardial infarction, or implantable device/pacemaker [No Pertinent Pulmonary History] : no history of asthma, COPD, sleep apnea, or smoking [No Adverse Anesthesia Reaction] : no adverse anesthesia reaction in self or family member [Chronic Anticoagulation] : no chronic anticoagulation [Chronic Kidney Disease] : no chronic kidney disease [Diabetes] : no diabetes [(Patient denies any chest pain, claudication, dyspnea on exertion, orthopnea, palpitations or syncope)] : Patient denies any chest pain, claudication, dyspnea on exertion, orthopnea, palpitations or syncope [FreeTextEntry1] : lithotripsy  [FreeTextEntry2] : 7/7/23 [FreeTextEntry3] : Dr. Leblanc [FreeTextEntry4] : Presents for preoperative clearance.  Denies recent illness, cough, temp elevation, new urinary symptoms.

## 2023-07-06 ENCOUNTER — APPOINTMENT (OUTPATIENT)
Dept: RADIOLOGY | Facility: HOSPITAL | Age: 63
End: 2023-07-06
Payer: COMMERCIAL

## 2023-07-06 ENCOUNTER — OUTPATIENT (OUTPATIENT)
Dept: OUTPATIENT SERVICES | Facility: HOSPITAL | Age: 63
LOS: 1 days | End: 2023-07-06
Payer: COMMERCIAL

## 2023-07-06 DIAGNOSIS — N20.0 CALCULUS OF KIDNEY: ICD-10-CM

## 2023-07-06 DIAGNOSIS — Z98.890 OTHER SPECIFIED POSTPROCEDURAL STATES: Chronic | ICD-10-CM

## 2023-07-06 DIAGNOSIS — Z00.8 ENCOUNTER FOR OTHER GENERAL EXAMINATION: ICD-10-CM

## 2023-07-06 PROCEDURE — 74018 RADEX ABDOMEN 1 VIEW: CPT

## 2023-07-06 PROCEDURE — 74018 RADEX ABDOMEN 1 VIEW: CPT | Mod: 26

## 2023-07-25 ENCOUNTER — APPOINTMENT (OUTPATIENT)
Dept: RADIOLOGY | Facility: HOSPITAL | Age: 63
End: 2023-07-25
Payer: COMMERCIAL

## 2023-07-25 ENCOUNTER — OUTPATIENT (OUTPATIENT)
Dept: OUTPATIENT SERVICES | Facility: HOSPITAL | Age: 63
LOS: 1 days | End: 2023-07-25
Payer: COMMERCIAL

## 2023-07-25 DIAGNOSIS — Z98.890 OTHER SPECIFIED POSTPROCEDURAL STATES: Chronic | ICD-10-CM

## 2023-07-25 DIAGNOSIS — N20.0 CALCULUS OF KIDNEY: ICD-10-CM

## 2023-07-25 PROCEDURE — 74018 RADEX ABDOMEN 1 VIEW: CPT

## 2023-07-25 PROCEDURE — 74018 RADEX ABDOMEN 1 VIEW: CPT | Mod: 26

## 2023-07-26 ENCOUNTER — RX RENEWAL (OUTPATIENT)
Age: 63
End: 2023-07-26

## 2023-08-09 ENCOUNTER — OUTPATIENT (OUTPATIENT)
Dept: OUTPATIENT SERVICES | Facility: HOSPITAL | Age: 63
LOS: 1 days | Discharge: HOME | End: 2023-08-09

## 2023-08-09 DIAGNOSIS — Z98.890 OTHER SPECIFIED POSTPROCEDURAL STATES: Chronic | ICD-10-CM

## 2023-08-09 DIAGNOSIS — M85.80 OTHER SPECIFIED DISORDERS OF BONE DENSITY AND STRUCTURE, UNSPECIFIED SITE: ICD-10-CM

## 2023-08-11 ENCOUNTER — APPOINTMENT (OUTPATIENT)
Dept: ULTRASOUND IMAGING | Facility: HOSPITAL | Age: 63
End: 2023-08-11
Payer: COMMERCIAL

## 2023-08-11 ENCOUNTER — APPOINTMENT (OUTPATIENT)
Dept: RADIOLOGY | Facility: HOSPITAL | Age: 63
End: 2023-08-11
Payer: COMMERCIAL

## 2023-08-11 ENCOUNTER — OUTPATIENT (OUTPATIENT)
Dept: OUTPATIENT SERVICES | Facility: HOSPITAL | Age: 63
LOS: 1 days | End: 2023-08-11
Payer: COMMERCIAL

## 2023-08-11 DIAGNOSIS — Z00.8 ENCOUNTER FOR OTHER GENERAL EXAMINATION: ICD-10-CM

## 2023-08-11 DIAGNOSIS — Z98.890 OTHER SPECIFIED POSTPROCEDURAL STATES: Chronic | ICD-10-CM

## 2023-08-11 PROCEDURE — 76775 US EXAM ABDO BACK WALL LIM: CPT

## 2023-08-11 PROCEDURE — 74018 RADEX ABDOMEN 1 VIEW: CPT | Mod: 26

## 2023-08-11 PROCEDURE — 74018 RADEX ABDOMEN 1 VIEW: CPT

## 2023-08-11 PROCEDURE — 76775 US EXAM ABDO BACK WALL LIM: CPT | Mod: 26

## 2023-08-15 LAB
ALBUMIN SERPL ELPH-MCNC: 4.6 G/DL
ALP BLD-CCNC: 126 U/L
ALT SERPL-CCNC: 35 U/L
ANION GAP SERPL CALC-SCNC: 11 MMOL/L
AST SERPL-CCNC: 27 U/L
BILIRUB SERPL-MCNC: 0.6 MG/DL
BUN SERPL-MCNC: 12 MG/DL
CALCIUM SERPL-MCNC: 10.5 MG/DL
CHLORIDE SERPL-SCNC: 108 MMOL/L
CO2 SERPL-SCNC: 22 MMOL/L
CREAT SERPL-MCNC: 0.69 MG/DL
EGFR: 97 ML/MIN/1.73M2
GLUCOSE SERPL-MCNC: 110 MG/DL
POTASSIUM SERPL-SCNC: 4.5 MMOL/L
PROT SERPL-MCNC: 6.7 G/DL
SODIUM SERPL-SCNC: 142 MMOL/L

## 2023-08-16 ENCOUNTER — NON-APPOINTMENT (OUTPATIENT)
Age: 63
End: 2023-08-16

## 2023-08-17 ENCOUNTER — APPOINTMENT (OUTPATIENT)
Dept: HEMATOLOGY ONCOLOGY | Facility: CLINIC | Age: 63
End: 2023-08-17
Payer: COMMERCIAL

## 2023-08-17 VITALS
BODY MASS INDEX: 39.54 KG/M2 | HEART RATE: 80 BPM | RESPIRATION RATE: 16 BRPM | TEMPERATURE: 98 F | HEIGHT: 60.98 IN | DIASTOLIC BLOOD PRESSURE: 86 MMHG | OXYGEN SATURATION: 98 % | SYSTOLIC BLOOD PRESSURE: 147 MMHG | WEIGHT: 209.44 LBS

## 2023-08-17 DIAGNOSIS — Z92.89 PERSONAL HISTORY OF OTHER MEDICAL TREATMENT: ICD-10-CM

## 2023-08-17 PROCEDURE — 99214 OFFICE O/P EST MOD 30 MIN: CPT

## 2023-08-17 NOTE — ASSESSMENT
[FreeTextEntry1] : Lab work reviewed. #1) 5/2016-Right breast cancer -Stage IA ([id number]), ER positive/NE positive/HER-2/keke negative. Status post right breast conservation surgery--> RT-->Arimidex begun 10/2016, and discontinued 5/2018 secondary to arthralgias. Began Aromasin 6/2018. She completes 7 year course of AI 10/2023-discussed pros/cons of extended adjuvant endocrine therapy.  Patient wishes to continue AI therapy at least until her next visit. Clinically SOPHIE.  Mammogram done 4/28/2023–have requested report (patient brought letter which she received stating it was normal).  #2) h/o osteopenia-s/p 5 years of Prolia-most recent BDT 5/2022 WNL. Weight bearing exercise. Plans f/u with GYN MD (next appt. 9/28/2023) for continued monitoring-next due 2/2024.  #3) h/o recurrent intermittent hypercalcemia, and elevated alk. phos. Reported unremarkable endocrine evaluation.  6/2022 PET/CT scan-nonspecific FDG avid mild skin thickening in the right breast. Nonspecific small focal FDG avidity within the uterine cavity without CT correlate. Had f/u unremarkable GYN evaluation. Note urine and serum immunofixation negative for monoclonal protein 7/2021, and WBBS was negative for evidence of osseous metastases. Most recent calcium level WNL, and alk. phos. trending downward. Follow clinically. If progressive abormality(-ies), can consider re-evaluation.  #4) Continue f/u with PCP for BP/primary care issues.  Patient was given the opportunity to ask questions. Her questions have been answered to her apparent satisfaction at this time.  -->RTO 6 months.

## 2023-08-17 NOTE — HISTORY OF PRESENT ILLNESS
[de-identified] : 5/2016-Right breast cancer -Stage IA ([id number]), ER positive/[place] positive/HER-2/keke negative. Status post right breast conservation surgery--> RT-->Arimidex begun 10/2016, and discontinued 5/2018 secondary to arthralgias. Began Aromasin 6/2018.  Osteopenia-received biannual Prolia injections on aromatase inhibitor therapy-last dose 7/2021.  [de-identified] : Invasive ductal carcinoma [de-identified] : Oncotype recurrence score of 12, placing patient in low-risk category.\par   [de-identified] : Mammogram 4/2023-[facility]-told normal. Had renal stone lithotripsy 7/7/23-hasn't passed stones yet-placed on Flomax. Has f/u with  scheduled. Breast MRI scheduled 10/2023. Sees Dr. Peña Q year now-gets breast imaging scripts from him. Sees Dr. Bhakta in near future for GYN f/u. Sees Dr. Bhakta yearly-and will do further BDT under his guidance. No current fevers. No c/o chest pain, shortness of breath, change in bowel or bladder habits. No abdominal pain. No c/o bleeding. No c/o bone pain.  Stopped calcium and vitamin D supplements.

## 2023-08-29 ENCOUNTER — APPOINTMENT (OUTPATIENT)
Dept: RADIOLOGY | Facility: HOSPITAL | Age: 63
End: 2023-08-29
Payer: COMMERCIAL

## 2023-08-29 ENCOUNTER — OUTPATIENT (OUTPATIENT)
Dept: OUTPATIENT SERVICES | Facility: HOSPITAL | Age: 63
LOS: 1 days | End: 2023-08-29
Payer: COMMERCIAL

## 2023-08-29 DIAGNOSIS — Z00.8 ENCOUNTER FOR OTHER GENERAL EXAMINATION: ICD-10-CM

## 2023-08-29 DIAGNOSIS — Z98.890 OTHER SPECIFIED POSTPROCEDURAL STATES: Chronic | ICD-10-CM

## 2023-08-29 PROCEDURE — 74018 RADEX ABDOMEN 1 VIEW: CPT | Mod: 26

## 2023-08-29 PROCEDURE — 74018 RADEX ABDOMEN 1 VIEW: CPT

## 2023-09-14 ENCOUNTER — OUTPATIENT (OUTPATIENT)
Dept: OUTPATIENT SERVICES | Facility: HOSPITAL | Age: 63
LOS: 1 days | End: 2023-09-14
Payer: COMMERCIAL

## 2023-09-14 VITALS
RESPIRATION RATE: 18 BRPM | SYSTOLIC BLOOD PRESSURE: 160 MMHG | DIASTOLIC BLOOD PRESSURE: 85 MMHG | OXYGEN SATURATION: 99 % | TEMPERATURE: 98 F | HEART RATE: 72 BPM | HEIGHT: 60 IN | WEIGHT: 207.23 LBS

## 2023-09-14 DIAGNOSIS — Z01.818 ENCOUNTER FOR OTHER PREPROCEDURAL EXAMINATION: ICD-10-CM

## 2023-09-14 DIAGNOSIS — Z98.890 OTHER SPECIFIED POSTPROCEDURAL STATES: Chronic | ICD-10-CM

## 2023-09-14 DIAGNOSIS — N20.0 CALCULUS OF KIDNEY: ICD-10-CM

## 2023-09-14 PROCEDURE — 36415 COLL VENOUS BLD VENIPUNCTURE: CPT

## 2023-09-14 PROCEDURE — G0463: CPT

## 2023-09-14 PROCEDURE — 87086 URINE CULTURE/COLONY COUNT: CPT

## 2023-09-14 PROCEDURE — 81001 URINALYSIS AUTO W/SCOPE: CPT

## 2023-09-14 NOTE — H&P PST ADULT - PHARYNX
Mallampati/normal/no redness/no discharge/no peritonsillar abscess Mallampati IV/normal/no redness/no discharge/no peritonsillar abscess

## 2023-09-14 NOTE — H&P PST ADULT - PROBLEM SELECTOR PLAN 1
Patient provided with pre-operative instructions and verbalized understanding.  Patient can take allegra but otherwise will be NPO on day of surgery. Patient will stop NSAIDs, aspirin, herbal supplements or vitamins 1 week prior to surgery.  Chlorhexidine wash provided with instructions, verbalized understanding.      Pending medical clearance as per surgeon, has appt 09/15/2023       CBC, CMP, EKG WNL

## 2023-09-14 NOTE — H&P PST ADULT - HISTORY OF PRESENT ILLNESS
Patient is a [age] year old M/F with PMHx of __ or no pertinent medical history presents for preoperative testing for ___ with  ___ scheduled for /2023. Reports__. Denies ___ or any acute symptoms at this time. Patient reports feeling well overall.    lithotripsy on 7/7 at [facility]     went to the ER in June right flank and was told she had kidney stones   Patient is a 63 year old F with PMHx of right breast cancer, GERD, depression presents for preoperative testing for cystoscopy right ureteroscopy with laser lithotripsy, right uretal placement with Dr. Pascual Leblanc scheduled for 09/27/2023. Reports going to the ER in 06/2023 for sudden right flank pain, diagnosed with kidney stones, had a lithotripsy on 07/07/2023, and has opted for surgical intervention to remove the kidney stones . Denies flank pain, hematuria, dysuria or any acute symptoms at this time. Patient reports feeling well overall.

## 2023-09-14 NOTE — H&P PST ADULT - NSICDXFAMILYHX_GEN_ALL_CORE_FT
FAMILY HISTORY:  Father  Still living? Unknown  FH: lung cancer, Age at diagnosis: Age Unknown    Mother  Still living? Unknown  FH: HTN (hypertension), Age at diagnosis: Age Unknown

## 2023-09-14 NOTE — H&P PST ADULT - NSICDXPASTSURGICALHX_GEN_ALL_CORE_FT
PAST SURGICAL HISTORY:  H/O shoulder surgery     History of lithotripsy     History of lumpectomy of right breast

## 2023-09-14 NOTE — H&P PST ADULT - COMMENTS
Denies h/o or family h/o DVT, PE  Denies bleeding or clotting disorders  Denies dentures/partials, loose teeth/caps 160/85 electronic

## 2023-09-15 ENCOUNTER — APPOINTMENT (OUTPATIENT)
Dept: FAMILY MEDICINE | Facility: CLINIC | Age: 63
End: 2023-09-15
Payer: COMMERCIAL

## 2023-09-15 VITALS
RESPIRATION RATE: 20 BRPM | WEIGHT: 210 LBS | HEART RATE: 76 BPM | HEIGHT: 61 IN | DIASTOLIC BLOOD PRESSURE: 75 MMHG | BODY MASS INDEX: 39.65 KG/M2 | SYSTOLIC BLOOD PRESSURE: 126 MMHG

## 2023-09-15 DIAGNOSIS — Z01.818 ENCOUNTER FOR OTHER PREPROCEDURAL EXAMINATION: ICD-10-CM

## 2023-09-15 DIAGNOSIS — N20.0 CALCULUS OF KIDNEY: ICD-10-CM

## 2023-09-15 PROCEDURE — 99214 OFFICE O/P EST MOD 30 MIN: CPT

## 2023-09-26 ENCOUNTER — TRANSCRIPTION ENCOUNTER (OUTPATIENT)
Age: 63
End: 2023-09-26

## 2023-09-26 NOTE — ASU PATIENT PROFILE, ADULT - NSICDXPASTMEDICALHX_GEN_ALL_CORE_FT
PAST MEDICAL HISTORY:  2019 novel coronavirus disease (COVID-19) rendisivir and high flow O2 Hospitalized at     Breast cancer, female right    Depression     GERD (gastroesophageal reflux disease)     Hypothyroidism     Leukocytosis     Osteopenia

## 2023-09-26 NOTE — ASU PATIENT PROFILE, ADULT - NSICDXPASTSURGICALHX_GEN_ALL_CORE_FT
PAST SURGICAL HISTORY:  H/O shoulder surgery left - metal anchor in place    History of lithotripsy     History of lumpectomy of right breast PINK BAND RIGHT

## 2023-09-27 ENCOUNTER — OUTPATIENT (OUTPATIENT)
Dept: OUTPATIENT SERVICES | Facility: HOSPITAL | Age: 63
LOS: 1 days | End: 2023-09-27
Payer: COMMERCIAL

## 2023-09-27 ENCOUNTER — TRANSCRIPTION ENCOUNTER (OUTPATIENT)
Age: 63
End: 2023-09-27

## 2023-09-27 VITALS
TEMPERATURE: 98 F | HEART RATE: 73 BPM | HEIGHT: 60 IN | DIASTOLIC BLOOD PRESSURE: 86 MMHG | OXYGEN SATURATION: 98 % | RESPIRATION RATE: 15 BRPM | WEIGHT: 207.23 LBS | SYSTOLIC BLOOD PRESSURE: 151 MMHG

## 2023-09-27 VITALS
RESPIRATION RATE: 16 BRPM | DIASTOLIC BLOOD PRESSURE: 77 MMHG | HEART RATE: 67 BPM | SYSTOLIC BLOOD PRESSURE: 131 MMHG | OXYGEN SATURATION: 99 %

## 2023-09-27 DIAGNOSIS — Z98.890 OTHER SPECIFIED POSTPROCEDURAL STATES: Chronic | ICD-10-CM

## 2023-09-27 DIAGNOSIS — N20.0 CALCULUS OF KIDNEY: ICD-10-CM

## 2023-09-27 PROCEDURE — 88300 SURGICAL PATH GROSS: CPT | Mod: 26

## 2023-09-27 DEVICE — URETERAL CATH FLEXIMA OPEN END 5FR 70CM: Type: IMPLANTABLE DEVICE | Status: FUNCTIONAL

## 2023-09-27 DEVICE — URETERAL CATH DUAL LUMEN 10FR 54CM: Type: IMPLANTABLE DEVICE | Status: FUNCTIONAL

## 2023-09-27 DEVICE — URETERAL STENT TRIA SOFT 6FR 24MM: Type: IMPLANTABLE DEVICE | Status: FUNCTIONAL

## 2023-09-27 DEVICE — LASER FIBER SINGLEFLEX HOLMIUM 270: Type: IMPLANTABLE DEVICE | Status: FUNCTIONAL

## 2023-09-27 DEVICE — GUIDEWIRE SENSOR NITINOL STRAIGHT .038" X 150CM: Type: IMPLANTABLE DEVICE | Status: FUNCTIONAL

## 2023-09-27 DEVICE — BALLOON CATH UROMAX ULTRA KIT 18FR X 4CM: Type: IMPLANTABLE DEVICE | Status: FUNCTIONAL

## 2023-09-27 DEVICE — URETEROSCOPE LITHOVUE DISP: Type: IMPLANTABLE DEVICE | Status: FUNCTIONAL

## 2023-09-27 RX ORDER — FLUOXETINE HCL 10 MG
60 CAPSULE ORAL
Qty: 0 | Refills: 0 | DISCHARGE

## 2023-09-27 RX ORDER — FEXOFENADINE HCL 30 MG
1 TABLET ORAL
Refills: 0 | DISCHARGE

## 2023-09-27 RX ORDER — SODIUM CHLORIDE 9 MG/ML
1000 INJECTION, SOLUTION INTRAVENOUS
Refills: 0 | Status: DISCONTINUED | OUTPATIENT
Start: 2023-09-27 | End: 2023-09-27

## 2023-09-27 RX ORDER — FUROSEMIDE 40 MG
1 TABLET ORAL
Refills: 0 | DISCHARGE

## 2023-09-27 RX ORDER — ONDANSETRON 8 MG/1
4 TABLET, FILM COATED ORAL ONCE
Refills: 0 | Status: DISCONTINUED | OUTPATIENT
Start: 2023-09-27 | End: 2023-09-27

## 2023-09-27 RX ORDER — HYDROMORPHONE HYDROCHLORIDE 2 MG/ML
0.5 INJECTION INTRAMUSCULAR; INTRAVENOUS; SUBCUTANEOUS
Refills: 0 | Status: DISCONTINUED | OUTPATIENT
Start: 2023-09-27 | End: 2023-09-27

## 2023-09-27 RX ORDER — MILK THISTLE 180 MG
0 CAPSULE ORAL
Refills: 0 | DISCHARGE

## 2023-09-27 RX ADMIN — SODIUM CHLORIDE 50 MILLILITER(S): 9 INJECTION, SOLUTION INTRAVENOUS at 11:44

## 2023-09-27 NOTE — BRIEF OPERATIVE NOTE - NSICDXBRIEFPROCEDURE_GEN_ALL_CORE_FT
PROCEDURES:  Ureteroscopy with laser lithotripsy and stent placement 27-Sep-2023 14:11:59  Pascual Leblanc

## 2023-09-27 NOTE — ASU DISCHARGE PLAN (ADULT/PEDIATRIC) - NS MD DC FALL RISK RISK
For information on Fall & Injury Prevention, visit: [email or web address] OR  [email or web address] OR  https://www.cdc.gov/steadi/patient.html

## 2023-09-27 NOTE — ASU DISCHARGE PLAN (ADULT/PEDIATRIC) - ASU DC SPECIAL INSTRUCTIONSFT
Drink inc fluids.  Expect blood in urine.  Schedule office appt in 2 weeks for office cysto and stent removal

## 2023-09-27 NOTE — BRIEF OPERATIVE NOTE - OPERATION/FINDINGS
Right upper ureteral stone fragments with hydronephrosis.    Ureteroscopy with laser lithotripsy done and stent placed.

## 2023-09-28 LAB — SURGICAL PATHOLOGY STUDY: SIGNIFICANT CHANGE UP

## 2023-09-28 PROCEDURE — 88300 SURGICAL PATH GROSS: CPT

## 2023-09-28 PROCEDURE — C1747: CPT

## 2023-09-28 PROCEDURE — 82365 CALCULUS SPECTROSCOPY: CPT

## 2023-09-28 PROCEDURE — C2617: CPT

## 2023-09-28 PROCEDURE — C1769: CPT

## 2023-09-28 PROCEDURE — C1726: CPT

## 2023-09-28 PROCEDURE — C1758: CPT

## 2023-09-28 PROCEDURE — 76000 FLUOROSCOPY <1 HR PHYS/QHP: CPT

## 2023-09-28 PROCEDURE — 52356 CYSTO/URETERO W/LITHOTRIPSY: CPT | Mod: RT

## 2023-09-28 PROCEDURE — C1889: CPT

## 2023-10-04 LAB
CELL MATERIAL STONE EST-MCNT: SIGNIFICANT CHANGE UP
LABORATORY COMMENT REPORT: SIGNIFICANT CHANGE UP
NIDUS STONE QN: SIGNIFICANT CHANGE UP

## 2023-10-15 ENCOUNTER — RX RENEWAL (OUTPATIENT)
Age: 63
End: 2023-10-15

## 2023-10-15 RX ORDER — FUROSEMIDE 20 MG/1
20 TABLET ORAL
Qty: 90 | Refills: 3 | Status: ACTIVE | COMMUNITY
Start: 2022-03-08 | End: 1900-01-01

## 2023-10-23 ENCOUNTER — APPOINTMENT (OUTPATIENT)
Dept: FAMILY MEDICINE | Facility: CLINIC | Age: 63
End: 2023-10-23
Payer: COMMERCIAL

## 2023-10-23 VITALS — WEIGHT: 206 LBS | BODY MASS INDEX: 38.92 KG/M2

## 2023-10-23 DIAGNOSIS — Z23 ENCOUNTER FOR IMMUNIZATION: ICD-10-CM

## 2023-10-23 PROCEDURE — G0008: CPT

## 2023-10-23 PROCEDURE — 90686 IIV4 VACC NO PRSV 0.5 ML IM: CPT

## 2023-10-26 ENCOUNTER — NON-APPOINTMENT (OUTPATIENT)
Age: 63
End: 2023-10-26

## 2023-12-20 ENCOUNTER — NON-APPOINTMENT (OUTPATIENT)
Age: 63
End: 2023-12-20

## 2024-02-07 ENCOUNTER — OUTPATIENT (OUTPATIENT)
Dept: OUTPATIENT SERVICES | Facility: HOSPITAL | Age: 64
LOS: 1 days | Discharge: HOME | End: 2024-02-07

## 2024-02-07 DIAGNOSIS — Z98.890 OTHER SPECIFIED POSTPROCEDURAL STATES: Chronic | ICD-10-CM

## 2024-02-07 DIAGNOSIS — M85.80 OTHER SPECIFIED DISORDERS OF BONE DENSITY AND STRUCTURE, UNSPECIFIED SITE: ICD-10-CM

## 2024-02-07 PROBLEM — C50.919 MALIGNANT NEOPLASM OF UNSPECIFIED SITE OF UNSPECIFIED FEMALE BREAST: Chronic | Status: ACTIVE | Noted: 2018-11-20

## 2024-02-07 PROBLEM — U07.1 COVID-19: Chronic | Status: ACTIVE | Noted: 2023-09-26

## 2024-02-14 LAB
ALBUMIN SERPL ELPH-MCNC: 4.5 G/DL
ALP BLD-CCNC: 107 U/L
ALT SERPL-CCNC: 33 U/L
ANION GAP SERPL CALC-SCNC: 11 MMOL/L
AST SERPL-CCNC: 32 U/L
BILIRUB SERPL-MCNC: 0.7 MG/DL
BUN SERPL-MCNC: 16 MG/DL
CALCIUM SERPL-MCNC: 10 MG/DL
CANCER AG27-29 SERPL-ACNC: 29.8 U/ML
CHLORIDE SERPL-SCNC: 105 MMOL/L
CO2 SERPL-SCNC: 22 MMOL/L
CREAT SERPL-MCNC: 0.65 MG/DL
EGFR: 98 ML/MIN/1.73M2
GLUCOSE SERPL-MCNC: 106 MG/DL
POTASSIUM SERPL-SCNC: 4.2 MMOL/L
PROT SERPL-MCNC: 6.8 G/DL
SODIUM SERPL-SCNC: 139 MMOL/L

## 2024-02-20 ENCOUNTER — APPOINTMENT (OUTPATIENT)
Dept: HEMATOLOGY ONCOLOGY | Facility: CLINIC | Age: 64
End: 2024-02-20
Payer: COMMERCIAL

## 2024-02-20 VITALS
OXYGEN SATURATION: 99 % | WEIGHT: 212.94 LBS | SYSTOLIC BLOOD PRESSURE: 127 MMHG | DIASTOLIC BLOOD PRESSURE: 76 MMHG | HEART RATE: 78 BPM | TEMPERATURE: 97.1 F | RESPIRATION RATE: 17 BRPM | BODY MASS INDEX: 40.24 KG/M2

## 2024-02-20 DIAGNOSIS — C50.911 MALIGNANT NEOPLASM OF UNSPECIFIED SITE OF RIGHT FEMALE BREAST: ICD-10-CM

## 2024-02-20 PROCEDURE — 99214 OFFICE O/P EST MOD 30 MIN: CPT

## 2024-02-20 NOTE — ASSESSMENT
[FreeTextEntry1] : CBC, CMP, CA 27-29 reviewed. #1) 5/2016-Right breast cancer -Stage IA ([id number]), ER positive/NH positive/HER-2/keke negative. Status post right breast conservation surgery--> RT-->Arimidex begun 10/2016, and discontinued 5/2018 secondary to arthralgias. Began Aromasin 6/2018. She completed 7 year course of AI 10/2023-discussed pros/cons of extended adjuvant endocrine therapy.  Patient has wished to continue AI therapy, and has decided for at least  for 10 years (considered her options). 4/28/2023-Mammogram/breast YL-yazmex-YO-RADS 2. Next breast imaging ordered by surgeon.  #2) h/o osteopenia-s/p 5 years of Prolia-most recent BDT 5/2022 WNL. Weight bearing exercise. Following now with GYN MD for continued monitoring-next BDT due.  #3) h/o recurrent intermittent hypercalcemia, and elevated alk. phos. Reported unremarkable endocrine evaluation.  6/2022 PET/CT scan-nonspecific FDG avid mild skin thickening in the right breast. Nonspecific small focal FDG avidity within the uterine cavity without CT correlate. Had f/u unremarkable GYN evaluation. Note urine and serum immunofixation negative for monoclonal protein 7/2021, and WBBS was negative for evidence of osseous metastases. Most recent calcium level WNL, as is alk. phos. Follow clinically. If progressive abormality(-ies), can consider re-evaluation.  #4) Continue f/u with PCP for BP/primary care issues.  Patient was given the opportunity to ask questions. Her questions have been answered to her apparent satisfaction at this time.  -->Anastrozole 1 mg Po daily;  RTO 6 months.

## 2024-02-20 NOTE — HISTORY OF PRESENT ILLNESS
[Disease: _____________________] : Disease: [unfilled] [T: ___] : T[unfilled] [N: ___] : N[unfilled] [M: ___] : M[unfilled] [AJCC Stage: ____] : AJCC Stage: [unfilled] [de-identified] : 5/2016-Right breast cancer -Stage IA ([id number]), ER positive/ND positive/HER-2/keke negative. Status post right breast conservation surgery--> RT-->Arimidex begun 10/2016, and discontinued 5/2018 secondary to arthralgias. Began Aromasin 6/2018.  Osteopenia-received biannual Prolia injections on aromatase inhibitor therapy-last dose 7/2021.  [de-identified] : Invasive ductal carcinoma [de-identified] : Oncotype recurrence score of 12, placing patient in low-risk category.\par   [de-identified] : Saw Dr. Peña last week-he ordered mammgram/breast US for 4/2024; and then breast MRI planned in the Fall. Sees Dr. Peña Q year now-gets breast imaging scripts from him. Sees GYN MD Dr. Bhakta yearly-and will do further BDT under his guidance. No current fevers. No c/o chest pain, shortness of breath, change in bowel or bladder habits. No abdominal pain. No c/o bleeding. No c/o bone pain.  Stopped calcium and vitamin D supplements.

## 2024-02-20 NOTE — PHYSICAL EXAM
[Fully active, able to carry on all pre-disease performance without restriction] : Status 0 - Fully active, able to carry on all pre-disease performance without restriction [Normal] : affect appropriate [de-identified] : no new dominant mass or nipple discharge

## 2024-03-13 RX ORDER — EXEMESTANE 25 MG/1
25 TABLET, FILM COATED ORAL
Qty: 90 | Refills: 1 | Status: ACTIVE | COMMUNITY
Start: 2018-06-01 | End: 1900-01-01

## 2024-07-03 ENCOUNTER — RX RENEWAL (OUTPATIENT)
Age: 64
End: 2024-07-03

## 2024-08-11 ENCOUNTER — OUTPATIENT (OUTPATIENT)
Dept: OUTPATIENT SERVICES | Facility: HOSPITAL | Age: 64
LOS: 1 days | Discharge: HOME | End: 2024-08-11

## 2024-08-11 DIAGNOSIS — Z98.890 OTHER SPECIFIED POSTPROCEDURAL STATES: Chronic | ICD-10-CM

## 2024-08-11 DIAGNOSIS — M85.80 OTHER SPECIFIED DISORDERS OF BONE DENSITY AND STRUCTURE, UNSPECIFIED SITE: ICD-10-CM

## 2024-08-17 PROBLEM — Z79.811 AROMATASE INHIBITOR USE: Status: ACTIVE | Noted: 2024-08-17

## 2024-08-21 ENCOUNTER — APPOINTMENT (OUTPATIENT)
Dept: HEMATOLOGY ONCOLOGY | Facility: CLINIC | Age: 64
End: 2024-08-21
Payer: MEDICARE

## 2024-08-21 VITALS
TEMPERATURE: 98 F | HEIGHT: 60.98 IN | DIASTOLIC BLOOD PRESSURE: 79 MMHG | BODY MASS INDEX: 37.88 KG/M2 | WEIGHT: 200.62 LBS | HEART RATE: 80 BPM | RESPIRATION RATE: 16 BRPM | OXYGEN SATURATION: 99 % | SYSTOLIC BLOOD PRESSURE: 132 MMHG

## 2024-08-21 DIAGNOSIS — Z92.89 PERSONAL HISTORY OF OTHER MEDICAL TREATMENT: ICD-10-CM

## 2024-08-21 DIAGNOSIS — C50.911 MALIGNANT NEOPLASM OF UNSPECIFIED SITE OF RIGHT FEMALE BREAST: ICD-10-CM

## 2024-08-21 DIAGNOSIS — Z79.811 LONG TERM (CURRENT) USE OF AROMATASE INHIBITORS: ICD-10-CM

## 2024-08-21 DIAGNOSIS — Z12.4 ENCOUNTER FOR SCREENING FOR MALIGNANT NEOPLASM OF CERVIX: ICD-10-CM

## 2024-08-21 PROCEDURE — 99204 OFFICE O/P NEW MOD 45 MIN: CPT

## 2024-08-21 PROCEDURE — G2211 COMPLEX E/M VISIT ADD ON: CPT

## 2024-08-21 NOTE — ASSESSMENT
[FreeTextEntry1] : Lab work reviewed. #1) 5/2016-Right breast cancer -Stage IA ([id number]), ER positive/OK positive/HER-2/keke negative. Status post right breast conservation surgery--> RT-->Arimidex begun 10/2016, and discontinued 5/2018 secondary to arthralgias. Began Aromasin 6/2018. She completed 7 year course of AI 10/2023-discussed pros/cons of extended adjuvant endocrine therapy.  Patient has wished to continue AI therapy, and has decided for at least  for 10 years (considered her options).  --clinically SOPHIE  4/2024-Mammogram/breast US-reportedly unremarkable; have requested report from [facility].  Breast MRI-planned in October. Next breast imaging ordered by surgeon.  #2) h/o osteopenia-s/p 5 years of Prolia-most recent BDT 3/2024 WNL. Weight bearing exercise. Following now with GYN MD for continued monitoring.  #3) h/o recurrent intermittent hypercalcemia, and elevated alk. phos. Reported unremarkable endocrine evaluation.  6/2022 PET/CT scan-nonspecific FDG avid mild skin thickening in the right breast. Nonspecific small focal FDG avidity within the uterine cavity without CT correlate. Had f/u unremarkable GYN evaluation. Note urine and serum immunofixation negative for monoclonal protein 7/2021, and WBBS was negative for evidence of osseous metastases. Most recent calcium level WNL, as is alk. phos. 8/2024.  --Follow clinically.  --If progressive abormality(-ies), can consider re-evaluation.  #4) Continue f/u with PCP for BP/primary care issues.  Patient was given the opportunity to ask questions. Her questions have been answered to her apparent satisfaction at this time.  -->Anastrozole 1 mg Po daily;  RTO 6 months.

## 2024-08-21 NOTE — ASSESSMENT
[FreeTextEntry1] : Lab work reviewed. #1) 5/2016-Right breast cancer -Stage IA ([id number]), ER positive/MT positive/HER-2/keke negative. Status post right breast conservation surgery--> RT-->Arimidex begun 10/2016, and discontinued 5/2018 secondary to arthralgias. Began Aromasin 6/2018. She completed 7 year course of AI 10/2023-discussed pros/cons of extended adjuvant endocrine therapy.  Patient has wished to continue AI therapy, and has decided for at least  for 10 years (considered her options).  --clinically SOPHIE  4/2024-Mammogram/breast US-reportedly unremarkable; have requested report from [facility].  Breast MRI-planned in October. Next breast imaging ordered by surgeon.  #2) h/o osteopenia-s/p 5 years of Prolia-most recent BDT 3/2024 WNL. Weight bearing exercise. Following now with GYN MD for continued monitoring.  #3) h/o recurrent intermittent hypercalcemia, and elevated alk. phos. Reported unremarkable endocrine evaluation.  6/2022 PET/CT scan-nonspecific FDG avid mild skin thickening in the right breast. Nonspecific small focal FDG avidity within the uterine cavity without CT correlate. Had f/u unremarkable GYN evaluation. Note urine and serum immunofixation negative for monoclonal protein 7/2021, and WBBS was negative for evidence of osseous metastases. Most recent calcium level WNL, as is alk. phos. 8/2024.  --Follow clinically.  --If progressive abormality(-ies), can consider re-evaluation.  #4) Continue f/u with PCP for BP/primary care issues.  Patient was given the opportunity to ask questions. Her questions have been answered to her apparent satisfaction at this time.  -->Anastrozole 1 mg Po daily;  RTO 6 months.

## 2024-08-21 NOTE — CONSULT LETTER
[Dear  ___] : Dear  [unfilled], [Courtesy Letter:] : I had the pleasure of seeing your patient, [unfilled], in my office today. [Please see my note below.] : Please see my note below. [Consult Closing:] : Thank you very much for allowing me to participate in the care of this patient.  If you have any questions, please do not hesitate to contact me. [Sincerely,] : Sincerely, [FreeTextEntry3] : Lioda Johnson MD

## 2024-08-21 NOTE — ASSESSMENT
[FreeTextEntry1] : Lab work reviewed. #1) 5/2016-Right breast cancer -Stage IA ([id number]), ER positive/OR positive/HER-2/keke negative. Status post right breast conservation surgery--> RT-->Arimidex begun 10/2016, and discontinued 5/2018 secondary to arthralgias. Began Aromasin 6/2018. She completed 7 year course of AI 10/2023-discussed pros/cons of extended adjuvant endocrine therapy.  Patient has wished to continue AI therapy, and has decided for at least  for 10 years (considered her options).  --clinically SOPHIE  4/2024-Mammogram/breast US-reportedly unremarkable; have requested report from [facility].  Breast MRI-planned in October. Next breast imaging ordered by surgeon.  #2) h/o osteopenia-s/p 5 years of Prolia-most recent BDT 3/2024 WNL. Weight bearing exercise. Following now with GYN MD for continued monitoring.  #3) h/o recurrent intermittent hypercalcemia, and elevated alk. phos. Reported unremarkable endocrine evaluation.  6/2022 PET/CT scan-nonspecific FDG avid mild skin thickening in the right breast. Nonspecific small focal FDG avidity within the uterine cavity without CT correlate. Had f/u unremarkable GYN evaluation. Note urine and serum immunofixation negative for monoclonal protein 7/2021, and WBBS was negative for evidence of osseous metastases. Most recent calcium level WNL, as is alk. phos. 8/2024.  --Follow clinically.  --If progressive abormality(-ies), can consider re-evaluation.  #4) Continue f/u with PCP for BP/primary care issues.  Patient was given the opportunity to ask questions. Her questions have been answered to her apparent satisfaction at this time.  -->Anastrozole 1 mg Po daily;  RTO 6 months.

## 2024-08-21 NOTE — PHYSICAL EXAM
[Fully active, able to carry on all pre-disease performance without restriction] : Status 0 - Fully active, able to carry on all pre-disease performance without restriction [Normal] : affect appropriate [de-identified] : no new dominant mass or nipple discharge

## 2024-08-21 NOTE — PHYSICAL EXAM
[Fully active, able to carry on all pre-disease performance without restriction] : Status 0 - Fully active, able to carry on all pre-disease performance without restriction [Normal] : affect appropriate [de-identified] : no new dominant mass or nipple discharge

## 2024-08-21 NOTE — HISTORY OF PRESENT ILLNESS
[Disease: _____________________] : Disease: [unfilled] [T: ___] : T[unfilled] [N: ___] : N[unfilled] [M: ___] : M[unfilled] [AJCC Stage: ____] : AJCC Stage: [unfilled] [de-identified] : 5/2016-Right breast cancer -Stage IA ([id number]), ER positive/[place] positive/HER-2/keke negative. Status post right breast conservation surgery--> RT-->Arimidex begun 10/2016, and discontinued 5/2018 secondary to arthralgias. Began Aromasin 6/2018.  Osteopenia-received biannual Prolia injections on aromatase inhibitor therapy-last dose 7/2021.  [de-identified] : Invasive ductal carcinoma [de-identified] : Oncotype recurrence score of 12, placing patient in low-risk category.\par   [de-identified] : Had mammogram/US in April-told WNL-[facility]. Breast MRI scheduled 10/2024. Taking Theralith for elevated calcium in urine, per Dr. Leblanc. Stopped calcium and vitamin D supplements. BDT 3/2024-Dr. Bhakta ([place])-[facility] (reviewed patient's copy). Sees GYN MD Dr. Bhakta yearly-and will do further BDT under his guidance. Saw Dr. Peña in f/u-told breast eval. unremarkable. No current fevers. No c/o chest pain, shortness of breath, change in bowel or bladder habits. No abdominal pain. No c/o bleeding. No c/o bone pain.  Sister-in-law Rosario Gama ill in the hospital-sepsis, kidney disease.

## 2024-08-21 NOTE — HISTORY OF PRESENT ILLNESS
[Disease: _____________________] : Disease: [unfilled] [T: ___] : T[unfilled] [N: ___] : N[unfilled] [M: ___] : M[unfilled] [AJCC Stage: ____] : AJCC Stage: [unfilled] [de-identified] : 5/2016-Right breast cancer -Stage IA ([id number]), ER positive/[place] positive/HER-2/keke negative. Status post right breast conservation surgery--> RT-->Arimidex begun 10/2016, and discontinued 5/2018 secondary to arthralgias. Began Aromasin 6/2018.  Osteopenia-received biannual Prolia injections on aromatase inhibitor therapy-last dose 7/2021.  [de-identified] : Invasive ductal carcinoma [de-identified] : Oncotype recurrence score of 12, placing patient in low-risk category.\par   [de-identified] : Had mammogram/US in April-told WNL-[facility]. Breast MRI scheduled 10/2024. Taking Theralith for elevated calcium in urine, per Dr. Leblanc. Stopped calcium and vitamin D supplements. BDT 3/2024-Dr. Bhakta ([place])-[facility] (reviewed patient's copy). Sees GYN MD Dr. Bhakta yearly-and will do further BDT under his guidance. Saw Dr. Peña in f/u-told breast eval. unremarkable. No current fevers. No c/o chest pain, shortness of breath, change in bowel or bladder habits. No abdominal pain. No c/o bleeding. No c/o bone pain.  Sister-in-law Rosario Gama ill in the hospital-sepsis, kidney disease.

## 2024-08-21 NOTE — HISTORY OF PRESENT ILLNESS
[Disease: _____________________] : Disease: [unfilled] [T: ___] : T[unfilled] [N: ___] : N[unfilled] [M: ___] : M[unfilled] [AJCC Stage: ____] : AJCC Stage: [unfilled] [de-identified] : 5/2016-Right breast cancer -Stage IA ([id number]), ER positive/OR positive/HER-2/keke negative. Status post right breast conservation surgery--> RT-->Arimidex begun 10/2016, and discontinued 5/2018 secondary to arthralgias. Began Aromasin 6/2018.  Osteopenia-received biannual Prolia injections on aromatase inhibitor therapy-last dose 7/2021.  [de-identified] : Invasive ductal carcinoma [de-identified] : Oncotype recurrence score of 12, placing patient in low-risk category.\par   [de-identified] : Had mammogram/US in April-told WNL-[facility]. Breast MRI scheduled 10/2024. Taking Theralith for elevated calcium in urine, per Dr. Leblanc. Stopped calcium and vitamin D supplements. BDT 3/2024-Dr. Bhakta ([place])-[facility] (reviewed patient's copy). Sees GYN MD Dr. Bhakta yearly-and will do further BDT under his guidance. Saw Dr. Peña in f/u-told breast eval. unremarkable. No current fevers. No c/o chest pain, shortness of breath, change in bowel or bladder habits. No abdominal pain. No c/o bleeding. No c/o bone pain.  Sister-in-law Rosario Gama ill in the hospital-sepsis, kidney disease.

## 2024-08-21 NOTE — PHYSICAL EXAM
[Fully active, able to carry on all pre-disease performance without restriction] : Status 0 - Fully active, able to carry on all pre-disease performance without restriction [Normal] : affect appropriate [de-identified] : no new dominant mass or nipple discharge

## 2024-09-26 ENCOUNTER — RX RENEWAL (OUTPATIENT)
Age: 64
End: 2024-09-26

## 2024-11-12 ENCOUNTER — APPOINTMENT (OUTPATIENT)
Dept: FAMILY MEDICINE | Facility: CLINIC | Age: 64
End: 2024-11-12
Payer: MEDICARE

## 2024-11-12 VITALS
RESPIRATION RATE: 20 BRPM | HEART RATE: 76 BPM | HEIGHT: 61 IN | SYSTOLIC BLOOD PRESSURE: 122 MMHG | BODY MASS INDEX: 36.63 KG/M2 | DIASTOLIC BLOOD PRESSURE: 70 MMHG | WEIGHT: 194 LBS

## 2024-11-12 DIAGNOSIS — E78.5 HYPERLIPIDEMIA, UNSPECIFIED: ICD-10-CM

## 2024-11-12 DIAGNOSIS — Z23 ENCOUNTER FOR IMMUNIZATION: ICD-10-CM

## 2024-11-12 DIAGNOSIS — R73.01 IMPAIRED FASTING GLUCOSE: ICD-10-CM

## 2024-11-12 DIAGNOSIS — E03.9 HYPOTHYROIDISM, UNSPECIFIED: ICD-10-CM

## 2024-11-12 PROCEDURE — 90662 IIV NO PRSV INCREASED AG IM: CPT

## 2024-11-12 PROCEDURE — 36415 COLL VENOUS BLD VENIPUNCTURE: CPT

## 2024-11-12 PROCEDURE — G2211 COMPLEX E/M VISIT ADD ON: CPT

## 2024-11-12 PROCEDURE — 99204 OFFICE O/P NEW MOD 45 MIN: CPT

## 2024-11-12 PROCEDURE — G0008: CPT

## 2024-11-13 LAB
25(OH)D3 SERPL-MCNC: 33.7 NG/ML
ALBUMIN SERPL ELPH-MCNC: 4.3 G/DL
ALP BLD-CCNC: 100 U/L
ALT SERPL-CCNC: 39 U/L
ANION GAP SERPL CALC-SCNC: 13 MMOL/L
AST SERPL-CCNC: 29 U/L
BILIRUB SERPL-MCNC: 0.6 MG/DL
BUN SERPL-MCNC: 14 MG/DL
CALCIUM SERPL-MCNC: 10.4 MG/DL
CHLORIDE SERPL-SCNC: 105 MMOL/L
CHOLEST SERPL-MCNC: 184 MG/DL
CO2 SERPL-SCNC: 21 MMOL/L
CREAT SERPL-MCNC: 0.66 MG/DL
EGFR: 97 ML/MIN/1.73M2
ESTIMATED AVERAGE GLUCOSE: 100 MG/DL
GLUCOSE SERPL-MCNC: 100 MG/DL
HBA1C MFR BLD HPLC: 5.1 %
HCT VFR BLD CALC: 43 %
HDLC SERPL-MCNC: 53 MG/DL
HGB BLD-MCNC: 14.5 G/DL
LDLC SERPL CALC-MCNC: 114 MG/DL
MCHC RBC-ENTMCNC: 31.4 PG
MCHC RBC-ENTMCNC: 33.7 G/DL
MCV RBC AUTO: 93.1 FL
NONHDLC SERPL-MCNC: 131 MG/DL
PLATELET # BLD AUTO: 338 K/UL
POTASSIUM SERPL-SCNC: 4.3 MMOL/L
PROT SERPL-MCNC: 6.6 G/DL
RBC # BLD: 4.62 M/UL
RBC # FLD: 12.7 %
SODIUM SERPL-SCNC: 139 MMOL/L
T4 FREE SERPL-MCNC: 1.3 NG/DL
TRIGL SERPL-MCNC: 98 MG/DL
TSH SERPL-ACNC: 1.61 UIU/ML
WBC # FLD AUTO: 8.21 K/UL

## 2024-11-18 RX ORDER — AMOXICILLIN AND CLAVULANATE POTASSIUM 500; 125 MG/1; MG/1
500-125 TABLET, FILM COATED ORAL
Qty: 14 | Refills: 0 | Status: ACTIVE | COMMUNITY
Start: 2024-11-18 | End: 1900-01-01

## 2024-12-03 ENCOUNTER — APPOINTMENT (OUTPATIENT)
Dept: FAMILY MEDICINE | Facility: CLINIC | Age: 64
End: 2024-12-03
Payer: MEDICARE

## 2024-12-03 ENCOUNTER — NON-APPOINTMENT (OUTPATIENT)
Age: 64
End: 2024-12-03

## 2024-12-03 VITALS
WEIGHT: 193 LBS | RESPIRATION RATE: 16 BRPM | TEMPERATURE: 97.4 F | HEIGHT: 61 IN | SYSTOLIC BLOOD PRESSURE: 150 MMHG | OXYGEN SATURATION: 98 % | HEART RATE: 84 BPM | BODY MASS INDEX: 36.44 KG/M2 | DIASTOLIC BLOOD PRESSURE: 90 MMHG

## 2024-12-03 PROCEDURE — 99214 OFFICE O/P EST MOD 30 MIN: CPT

## 2024-12-11 ENCOUNTER — APPOINTMENT (OUTPATIENT)
Dept: DERMATOLOGY | Facility: CLINIC | Age: 64
End: 2024-12-11
Payer: MEDICARE

## 2024-12-11 DIAGNOSIS — L30.1 DYSHIDROSIS [POMPHOLYX]: ICD-10-CM

## 2024-12-11 PROCEDURE — 99203 OFFICE O/P NEW LOW 30 MIN: CPT

## 2024-12-11 RX ORDER — BETAMETHASONE DIPROPIONATE 0.5 MG/G
0.05 CREAM, AUGMENTED TOPICAL TWICE DAILY
Qty: 1 | Refills: 1 | Status: ACTIVE | COMMUNITY
Start: 2024-12-11 | End: 1900-01-01

## 2024-12-12 ENCOUNTER — APPOINTMENT (OUTPATIENT)
Dept: OTOLARYNGOLOGY | Facility: CLINIC | Age: 64
End: 2024-12-12
Payer: MEDICARE

## 2024-12-12 VITALS
SYSTOLIC BLOOD PRESSURE: 130 MMHG | TEMPERATURE: 97.1 F | BODY MASS INDEX: 35.3 KG/M2 | WEIGHT: 187 LBS | DIASTOLIC BLOOD PRESSURE: 76 MMHG | HEIGHT: 61 IN | OXYGEN SATURATION: 97 % | RESPIRATION RATE: 16 BRPM | HEART RATE: 87 BPM

## 2024-12-12 DIAGNOSIS — E83.52 HYPERCALCEMIA: ICD-10-CM

## 2024-12-12 DIAGNOSIS — J32.9 CHRONIC SINUSITIS, UNSPECIFIED: ICD-10-CM

## 2024-12-12 DIAGNOSIS — H81.10 BENIGN PAROXYSMAL VERTIGO, UNSPECIFIED EAR: ICD-10-CM

## 2024-12-12 PROCEDURE — 92511 NASOPHARYNGOSCOPY: CPT

## 2024-12-12 PROCEDURE — 99203 OFFICE O/P NEW LOW 30 MIN: CPT | Mod: 25

## 2024-12-12 RX ORDER — DOXYCYCLINE HYCLATE 100 MG/1
100 TABLET ORAL
Qty: 14 | Refills: 0 | Status: COMPLETED | COMMUNITY
Start: 2024-12-03 | End: 2024-12-12

## 2024-12-12 RX ORDER — MECLIZINE HYDROCHLORIDE 12.5 MG/1
12.5 TABLET ORAL
Qty: 21 | Refills: 0 | Status: COMPLETED | COMMUNITY
Start: 2024-12-03 | End: 2024-12-12

## 2025-01-02 ENCOUNTER — APPOINTMENT (OUTPATIENT)
Dept: OTOLARYNGOLOGY | Facility: CLINIC | Age: 65
End: 2025-01-02
Payer: MEDICARE

## 2025-01-02 VITALS
HEIGHT: 61 IN | HEART RATE: 102 BPM | BODY MASS INDEX: 35.3 KG/M2 | SYSTOLIC BLOOD PRESSURE: 129 MMHG | WEIGHT: 187 LBS | DIASTOLIC BLOOD PRESSURE: 71 MMHG

## 2025-01-02 DIAGNOSIS — J32.9 CHRONIC SINUSITIS, UNSPECIFIED: ICD-10-CM

## 2025-01-02 PROCEDURE — 99204 OFFICE O/P NEW MOD 45 MIN: CPT

## 2025-01-02 PROCEDURE — 92567 TYMPANOMETRY: CPT

## 2025-01-02 PROCEDURE — 92557 COMPREHENSIVE HEARING TEST: CPT

## 2025-01-02 RX ORDER — AMOXICILLIN AND CLAVULANATE POTASSIUM 500; 125 MG/1; MG/1
500-125 TABLET, FILM COATED ORAL
Qty: 14 | Refills: 0 | Status: DISCONTINUED | COMMUNITY
Start: 2024-12-23 | End: 2025-01-02

## 2025-01-03 ENCOUNTER — APPOINTMENT (OUTPATIENT)
Dept: CT IMAGING | Facility: HOSPITAL | Age: 65
End: 2025-01-03
Payer: MEDICARE

## 2025-01-03 ENCOUNTER — OUTPATIENT (OUTPATIENT)
Dept: OUTPATIENT SERVICES | Facility: HOSPITAL | Age: 65
LOS: 1 days | End: 2025-01-03
Payer: MEDICARE

## 2025-01-03 DIAGNOSIS — Z98.890 OTHER SPECIFIED POSTPROCEDURAL STATES: Chronic | ICD-10-CM

## 2025-01-03 DIAGNOSIS — J30.9 ALLERGIC RHINITIS, UNSPECIFIED: ICD-10-CM

## 2025-01-03 PROBLEM — J32.9 RECURRENT SINUSITIS: Status: ACTIVE | Noted: 2025-01-03

## 2025-01-03 PROCEDURE — 70486 CT MAXILLOFACIAL W/O DYE: CPT | Mod: 26,MH

## 2025-01-03 PROCEDURE — 70486 CT MAXILLOFACIAL W/O DYE: CPT

## 2025-01-08 ENCOUNTER — APPOINTMENT (OUTPATIENT)
Dept: DERMATOLOGY | Facility: CLINIC | Age: 65
End: 2025-01-08
Payer: MEDICARE

## 2025-01-08 DIAGNOSIS — L29.9 PRURITUS, UNSPECIFIED: ICD-10-CM

## 2025-01-08 DIAGNOSIS — L30.9 DERMATITIS, UNSPECIFIED: ICD-10-CM

## 2025-01-08 DIAGNOSIS — D69.2 OTHER NONTHROMBOCYTOPENIC PURPURA: ICD-10-CM

## 2025-01-08 PROCEDURE — 99214 OFFICE O/P EST MOD 30 MIN: CPT

## 2025-01-08 RX ORDER — TRIAMCINOLONE ACETONIDE 1 MG/G
0.1 CREAM TOPICAL TWICE DAILY
Qty: 1 | Refills: 2 | Status: ACTIVE | COMMUNITY
Start: 2025-01-08 | End: 1900-01-01

## 2025-01-10 ENCOUNTER — NON-APPOINTMENT (OUTPATIENT)
Age: 65
End: 2025-01-10

## 2025-02-10 ENCOUNTER — NON-APPOINTMENT (OUTPATIENT)
Age: 65
End: 2025-02-10

## 2025-02-14 ENCOUNTER — NON-APPOINTMENT (OUTPATIENT)
Age: 65
End: 2025-02-14

## 2025-02-19 ENCOUNTER — OUTPATIENT (OUTPATIENT)
Dept: OUTPATIENT SERVICES | Facility: HOSPITAL | Age: 65
LOS: 1 days | Discharge: HOME | End: 2025-02-19

## 2025-02-19 DIAGNOSIS — M85.80 OTHER SPECIFIED DISORDERS OF BONE DENSITY AND STRUCTURE, UNSPECIFIED SITE: ICD-10-CM

## 2025-02-19 DIAGNOSIS — Z98.890 OTHER SPECIFIED POSTPROCEDURAL STATES: Chronic | ICD-10-CM

## 2025-02-21 ENCOUNTER — APPOINTMENT (OUTPATIENT)
Dept: FAMILY MEDICINE | Facility: CLINIC | Age: 65
End: 2025-02-21
Payer: MEDICARE

## 2025-02-21 VITALS
SYSTOLIC BLOOD PRESSURE: 130 MMHG | RESPIRATION RATE: 20 BRPM | BODY MASS INDEX: 34.74 KG/M2 | DIASTOLIC BLOOD PRESSURE: 70 MMHG | HEIGHT: 61 IN | WEIGHT: 184 LBS | HEART RATE: 76 BPM

## 2025-02-21 DIAGNOSIS — E03.9 HYPOTHYROIDISM, UNSPECIFIED: ICD-10-CM

## 2025-02-21 DIAGNOSIS — R73.01 IMPAIRED FASTING GLUCOSE: ICD-10-CM

## 2025-02-21 DIAGNOSIS — E78.5 HYPERLIPIDEMIA, UNSPECIFIED: ICD-10-CM

## 2025-02-21 PROCEDURE — 36415 COLL VENOUS BLD VENIPUNCTURE: CPT

## 2025-02-21 PROCEDURE — 99214 OFFICE O/P EST MOD 30 MIN: CPT

## 2025-02-21 PROCEDURE — G2211 COMPLEX E/M VISIT ADD ON: CPT

## 2025-02-22 DIAGNOSIS — H66.90 OTITIS MEDIA, UNSPECIFIED, UNSPECIFIED EAR: ICD-10-CM

## 2025-02-22 LAB
ALBUMIN SERPL ELPH-MCNC: 4.5 G/DL
ALP BLD-CCNC: 116 U/L
ALT SERPL-CCNC: 28 U/L
ANION GAP SERPL CALC-SCNC: 14 MMOL/L
AST SERPL-CCNC: 28 U/L
BILIRUB SERPL-MCNC: 0.5 MG/DL
BUN SERPL-MCNC: 16 MG/DL
CALCIUM SERPL-MCNC: 10.2 MG/DL
CHLORIDE SERPL-SCNC: 105 MMOL/L
CHOLEST SERPL-MCNC: 192 MG/DL
CO2 SERPL-SCNC: 20 MMOL/L
CREAT SERPL-MCNC: 0.65 MG/DL
EGFR: 98 ML/MIN/1.73M2
ESTIMATED AVERAGE GLUCOSE: 103 MG/DL
GLUCOSE SERPL-MCNC: 95 MG/DL
HBA1C MFR BLD HPLC: 5.2 %
HCT VFR BLD CALC: 42.9 %
HDLC SERPL-MCNC: 51 MG/DL
HGB BLD-MCNC: 14.6 G/DL
LDLC SERPL CALC-MCNC: 128 MG/DL
MCHC RBC-ENTMCNC: 30.7 PG
MCHC RBC-ENTMCNC: 34 G/DL
MCV RBC AUTO: 90.1 FL
NONHDLC SERPL-MCNC: 141 MG/DL
PLATELET # BLD AUTO: 368 K/UL
POTASSIUM SERPL-SCNC: 4.4 MMOL/L
PROT SERPL-MCNC: 7.1 G/DL
RBC # BLD: 4.76 M/UL
RBC # FLD: 12.9 %
SODIUM SERPL-SCNC: 139 MMOL/L
T4 FREE SERPL-MCNC: 1.3 NG/DL
TRIGL SERPL-MCNC: 69 MG/DL
TSH SERPL-ACNC: 1.63 UIU/ML
WBC # FLD AUTO: 9.95 K/UL

## 2025-02-22 RX ORDER — AMOXICILLIN AND CLAVULANATE POTASSIUM 875; 125 MG/1; MG/1
875-125 TABLET, COATED ORAL
Qty: 14 | Refills: 0 | Status: ACTIVE | COMMUNITY
Start: 2025-02-22 | End: 1900-01-01

## 2025-02-24 ENCOUNTER — APPOINTMENT (OUTPATIENT)
Dept: HEMATOLOGY ONCOLOGY | Facility: CLINIC | Age: 65
End: 2025-02-24
Payer: MEDICARE

## 2025-02-24 VITALS
HEART RATE: 86 BPM | SYSTOLIC BLOOD PRESSURE: 116 MMHG | OXYGEN SATURATION: 98 % | TEMPERATURE: 97.2 F | WEIGHT: 184.09 LBS | DIASTOLIC BLOOD PRESSURE: 71 MMHG | BODY MASS INDEX: 34.78 KG/M2 | RESPIRATION RATE: 16 BRPM

## 2025-02-24 DIAGNOSIS — C50.911 MALIGNANT NEOPLASM OF UNSPECIFIED SITE OF RIGHT FEMALE BREAST: ICD-10-CM

## 2025-02-24 DIAGNOSIS — Z79.811 LONG TERM (CURRENT) USE OF AROMATASE INHIBITORS: ICD-10-CM

## 2025-02-24 PROCEDURE — G2211 COMPLEX E/M VISIT ADD ON: CPT

## 2025-02-24 PROCEDURE — 99214 OFFICE O/P EST MOD 30 MIN: CPT

## 2025-03-06 RX ORDER — METHYLPREDNISOLONE 4 MG/1
4 TABLET ORAL
Qty: 1 | Refills: 0 | Status: ACTIVE | COMMUNITY
Start: 2025-03-06 | End: 1900-01-01

## 2025-06-14 ENCOUNTER — RX RENEWAL (OUTPATIENT)
Age: 65
End: 2025-06-14

## 2025-08-26 ENCOUNTER — APPOINTMENT (OUTPATIENT)
Dept: HEMATOLOGY ONCOLOGY | Facility: CLINIC | Age: 65
End: 2025-08-26
Payer: MEDICARE

## 2025-08-26 VITALS
BODY MASS INDEX: 34.99 KG/M2 | TEMPERATURE: 97.7 F | DIASTOLIC BLOOD PRESSURE: 81 MMHG | OXYGEN SATURATION: 99 % | HEART RATE: 67 BPM | SYSTOLIC BLOOD PRESSURE: 125 MMHG | WEIGHT: 185.19 LBS | RESPIRATION RATE: 16 BRPM

## 2025-08-26 DIAGNOSIS — Z79.811 LONG TERM (CURRENT) USE OF AROMATASE INHIBITORS: ICD-10-CM

## 2025-08-26 DIAGNOSIS — Z92.89 PERSONAL HISTORY OF OTHER MEDICAL TREATMENT: ICD-10-CM

## 2025-08-26 DIAGNOSIS — C50.911 MALIGNANT NEOPLASM OF UNSPECIFIED SITE OF RIGHT FEMALE BREAST: ICD-10-CM

## 2025-08-26 PROCEDURE — G2211 COMPLEX E/M VISIT ADD ON: CPT

## 2025-08-26 PROCEDURE — 99214 OFFICE O/P EST MOD 30 MIN: CPT

## 2025-09-08 ENCOUNTER — RX RENEWAL (OUTPATIENT)
Age: 65
End: 2025-09-08

## (undated) DEVICE — FOLEY CATH 3-WAY 22FR 30CC LATEX LUBRICATH

## (undated) DEVICE — DRAPE CAMERA VIDEO 7"X96"

## (undated) DEVICE — DRAINAGE BAG URINARY 2L

## (undated) DEVICE — ELCTR PLASMA BUTTON OVAL 24FR 12-30 DEG

## (undated) DEVICE — ELCTR BUTTON

## (undated) DEVICE — CABLE DAC ACTIVE CORD

## (undated) DEVICE — VENODYNE/SCD SLEEVE CALF MEDIUM

## (undated) DEVICE — POSITIONER FOAM HEAD CRADLE (PINK)

## (undated) DEVICE — WARMING BLANKET UPPER ADULT

## (undated) DEVICE — FOLEY HOLDER STATLOCK 2 WAY ADULT

## (undated) DEVICE — PACK CYSTO

## (undated) DEVICE — SOL IRR BAG NS 0.9% 3000ML

## (undated) DEVICE — GLV 7.5 PROTEXIS (WHITE)

## (undated) DEVICE — VALVE ENDO SURESEAL II 0-5FR

## (undated) DEVICE — ELCTR PLASMA LOOP MEDIUM ANGLED 24FR 12-30 DEG

## (undated) DEVICE — GLV 8 PROTEXIS (WHITE)